# Patient Record
Sex: MALE | Race: OTHER | NOT HISPANIC OR LATINO | URBAN - METROPOLITAN AREA
[De-identification: names, ages, dates, MRNs, and addresses within clinical notes are randomized per-mention and may not be internally consistent; named-entity substitution may affect disease eponyms.]

---

## 2024-08-22 ENCOUNTER — INPATIENT (INPATIENT)
Facility: HOSPITAL | Age: 69
LOS: 7 days | Discharge: HOME | End: 2024-08-30
Attending: HOSPITALIST | Admitting: HOSPITALIST
Payer: MEDICAID

## 2024-08-22 VITALS
DIASTOLIC BLOOD PRESSURE: 72 MMHG | HEART RATE: 93 BPM | RESPIRATION RATE: 16 BRPM | WEIGHT: 235.01 LBS | SYSTOLIC BLOOD PRESSURE: 106 MMHG | OXYGEN SATURATION: 97 % | HEIGHT: 68 IN | TEMPERATURE: 98 F

## 2024-08-22 DIAGNOSIS — Z78.9 OTHER SPECIFIED HEALTH STATUS: ICD-10-CM

## 2024-08-22 DIAGNOSIS — K74.60 UNSPECIFIED CIRRHOSIS OF LIVER: ICD-10-CM

## 2024-08-22 DIAGNOSIS — Z98.890 OTHER SPECIFIED POSTPROCEDURAL STATES: Chronic | ICD-10-CM

## 2024-08-22 DIAGNOSIS — E11.9 TYPE 2 DIABETES MELLITUS WITHOUT COMPLICATIONS: ICD-10-CM

## 2024-08-22 DIAGNOSIS — K76.0 FATTY (CHANGE OF) LIVER, NOT ELSEWHERE CLASSIFIED: ICD-10-CM

## 2024-08-22 DIAGNOSIS — E78.5 HYPERLIPIDEMIA, UNSPECIFIED: ICD-10-CM

## 2024-08-22 DIAGNOSIS — Z29.9 ENCOUNTER FOR PROPHYLACTIC MEASURES, UNSPECIFIED: ICD-10-CM

## 2024-08-22 DIAGNOSIS — E87.29 OTHER ACIDOSIS: ICD-10-CM

## 2024-08-22 DIAGNOSIS — I10 ESSENTIAL (PRIMARY) HYPERTENSION: ICD-10-CM

## 2024-08-22 LAB
A1C WITH ESTIMATED AVERAGE GLUCOSE RESULT: 8.1 % — HIGH (ref 4–5.6)
ADD ON TEST-SPECIMEN IN LAB: SIGNIFICANT CHANGE UP
ALBUMIN SERPL ELPH-MCNC: 2.8 G/DL — LOW (ref 3.3–5)
ALP SERPL-CCNC: 180 U/L — HIGH (ref 40–120)
ALT FLD-CCNC: 29 U/L — SIGNIFICANT CHANGE UP (ref 4–41)
AMMONIA BLD-MCNC: 52 UMOL/L — SIGNIFICANT CHANGE UP (ref 11–55)
ANION GAP SERPL CALC-SCNC: 16 MMOL/L — HIGH (ref 7–14)
APAP SERPL-MCNC: <10 UG/ML — LOW (ref 15–25)
APTT BLD: 39.5 SEC — HIGH (ref 24.5–35.6)
AST SERPL-CCNC: 99 U/L — HIGH (ref 4–40)
B-OH-BUTYR SERPL-SCNC: 1 MMOL/L — HIGH (ref 0–0.4)
BASOPHILS # BLD AUTO: 0.06 K/UL — SIGNIFICANT CHANGE UP (ref 0–0.2)
BASOPHILS NFR BLD AUTO: 0.6 % — SIGNIFICANT CHANGE UP (ref 0–2)
BILIRUB DIRECT SERPL-MCNC: 2.6 MG/DL — HIGH (ref 0–0.3)
BILIRUB INDIRECT FLD-MCNC: 1.2 MG/DL — HIGH (ref 0–1)
BILIRUB SERPL-MCNC: 3.8 MG/DL — HIGH (ref 0.2–1.2)
BILIRUB SERPL-MCNC: 3.8 MG/DL — HIGH (ref 0.2–1.2)
BUN SERPL-MCNC: 9 MG/DL — SIGNIFICANT CHANGE UP (ref 7–23)
CALCIUM SERPL-MCNC: 8.7 MG/DL — SIGNIFICANT CHANGE UP (ref 8.4–10.5)
CHLORIDE SERPL-SCNC: 95 MMOL/L — LOW (ref 98–107)
CO2 SERPL-SCNC: 20 MMOL/L — LOW (ref 22–31)
CREAT SERPL-MCNC: 1.08 MG/DL — SIGNIFICANT CHANGE UP (ref 0.5–1.3)
EGFR: 75 ML/MIN/1.73M2 — SIGNIFICANT CHANGE UP
EOSINOPHIL # BLD AUTO: 0.07 K/UL — SIGNIFICANT CHANGE UP (ref 0–0.5)
EOSINOPHIL NFR BLD AUTO: 0.7 % — SIGNIFICANT CHANGE UP (ref 0–6)
ESTIMATED AVERAGE GLUCOSE: 186 — SIGNIFICANT CHANGE UP
ETHANOL SERPL-MCNC: <10 MG/DL — SIGNIFICANT CHANGE UP
ETHANOL SERPL-MCNC: <10 MG/DL — SIGNIFICANT CHANGE UP
GAS PNL BLDV: SIGNIFICANT CHANGE UP
GLUCOSE BLDC GLUCOMTR-MCNC: 110 MG/DL — HIGH (ref 70–99)
GLUCOSE BLDC GLUCOMTR-MCNC: 119 MG/DL — HIGH (ref 70–99)
GLUCOSE SERPL-MCNC: 150 MG/DL — HIGH (ref 70–99)
HCT VFR BLD CALC: 41.4 % — SIGNIFICANT CHANGE UP (ref 39–50)
HGB BLD-MCNC: 14.6 G/DL — SIGNIFICANT CHANGE UP (ref 13–17)
IANC: 6.55 K/UL — SIGNIFICANT CHANGE UP (ref 1.8–7.4)
IGA FLD-MCNC: 260 MG/DL — SIGNIFICANT CHANGE UP (ref 70–400)
IGG FLD-MCNC: 1342 MG/DL — SIGNIFICANT CHANGE UP (ref 700–1600)
IGM SERPL-MCNC: 99 MG/DL — SIGNIFICANT CHANGE UP (ref 40–230)
IMM GRANULOCYTES NFR BLD AUTO: 1.4 % — HIGH (ref 0–0.9)
INR BLD: 1.57 RATIO — HIGH (ref 0.85–1.18)
LYMPHOCYTES # BLD AUTO: 1.25 K/UL — SIGNIFICANT CHANGE UP (ref 1–3.3)
LYMPHOCYTES # BLD AUTO: 13.4 % — SIGNIFICANT CHANGE UP (ref 13–44)
MAGNESIUM SERPL-MCNC: 1.6 MG/DL — SIGNIFICANT CHANGE UP (ref 1.6–2.6)
MCHC RBC-ENTMCNC: 31.9 PG — SIGNIFICANT CHANGE UP (ref 27–34)
MCHC RBC-ENTMCNC: 35.3 GM/DL — SIGNIFICANT CHANGE UP (ref 32–36)
MCV RBC AUTO: 90.4 FL — SIGNIFICANT CHANGE UP (ref 80–100)
MONOCYTES # BLD AUTO: 1.28 K/UL — HIGH (ref 0–0.9)
MONOCYTES NFR BLD AUTO: 13.7 % — SIGNIFICANT CHANGE UP (ref 2–14)
NEUTROPHILS # BLD AUTO: 6.55 K/UL — SIGNIFICANT CHANGE UP (ref 1.8–7.4)
NEUTROPHILS NFR BLD AUTO: 70.2 % — SIGNIFICANT CHANGE UP (ref 43–77)
NRBC # BLD: 0 /100 WBCS — SIGNIFICANT CHANGE UP (ref 0–0)
NRBC # FLD: 0 K/UL — SIGNIFICANT CHANGE UP (ref 0–0)
PHOSPHATE SERPL-MCNC: 2.9 MG/DL — SIGNIFICANT CHANGE UP (ref 2.5–4.5)
PLATELET # BLD AUTO: 258 K/UL — SIGNIFICANT CHANGE UP (ref 150–400)
POTASSIUM SERPL-MCNC: 4.3 MMOL/L — SIGNIFICANT CHANGE UP (ref 3.5–5.3)
POTASSIUM SERPL-SCNC: 4.3 MMOL/L — SIGNIFICANT CHANGE UP (ref 3.5–5.3)
PROT SERPL-MCNC: 6.6 G/DL — SIGNIFICANT CHANGE UP (ref 6–8.3)
PROTHROM AB SERPL-ACNC: 17.5 SEC — HIGH (ref 9.5–13)
RBC # BLD: 4.58 M/UL — SIGNIFICANT CHANGE UP (ref 4.2–5.8)
RBC # FLD: 15.3 % — HIGH (ref 10.3–14.5)
SALICYLATES SERPL-MCNC: <0.3 MG/DL — LOW (ref 15–30)
SODIUM SERPL-SCNC: 131 MMOL/L — LOW (ref 135–145)
TOXICOLOGY SCREEN, DRUGS OF ABUSE, SERUM RESULT: SIGNIFICANT CHANGE UP
TROPONIN T, HIGH SENSITIVITY RESULT: 19 NG/L — SIGNIFICANT CHANGE UP
WBC # BLD: 9.34 K/UL — SIGNIFICANT CHANGE UP (ref 3.8–10.5)
WBC # FLD AUTO: 9.34 K/UL — SIGNIFICANT CHANGE UP (ref 3.8–10.5)

## 2024-08-22 PROCEDURE — 74177 CT ABD & PELVIS W/CONTRAST: CPT | Mod: 26

## 2024-08-22 PROCEDURE — 70450 CT HEAD/BRAIN W/O DYE: CPT | Mod: 26

## 2024-08-22 PROCEDURE — 99285 EMERGENCY DEPT VISIT HI MDM: CPT

## 2024-08-22 PROCEDURE — 71260 CT THORAX DX C+: CPT | Mod: 26

## 2024-08-22 PROCEDURE — 99223 1ST HOSP IP/OBS HIGH 75: CPT | Mod: GC

## 2024-08-22 PROCEDURE — 99233 SBSQ HOSP IP/OBS HIGH 50: CPT

## 2024-08-22 RX ORDER — DEXTROSE 15 G/33 G
15 GEL IN PACKET (GRAM) ORAL ONCE
Refills: 0 | Status: DISCONTINUED | OUTPATIENT
Start: 2024-08-22 | End: 2024-08-30

## 2024-08-22 RX ORDER — ONDANSETRON 2 MG/ML
8 INJECTION, SOLUTION INTRAMUSCULAR; INTRAVENOUS EVERY 8 HOURS
Refills: 0 | Status: DISCONTINUED | OUTPATIENT
Start: 2024-08-22 | End: 2024-08-23

## 2024-08-22 RX ORDER — LORAZEPAM 4 MG/ML
1 INJECTION INTRAMUSCULAR; INTRAVENOUS
Refills: 0 | Status: DISCONTINUED | OUTPATIENT
Start: 2024-08-22 | End: 2024-08-25

## 2024-08-22 RX ORDER — FOLIC ACID 1 MG
1 TABLET ORAL DAILY
Refills: 0 | Status: DISCONTINUED | OUTPATIENT
Start: 2024-08-22 | End: 2024-08-30

## 2024-08-22 RX ORDER — DEXTROSE 15 G/33 G
25 GEL IN PACKET (GRAM) ORAL ONCE
Refills: 0 | Status: DISCONTINUED | OUTPATIENT
Start: 2024-08-22 | End: 2024-08-30

## 2024-08-22 RX ORDER — SODIUM CHLORIDE 9 MG/ML
250 INJECTION INTRAMUSCULAR; INTRAVENOUS; SUBCUTANEOUS ONCE
Refills: 0 | Status: COMPLETED | OUTPATIENT
Start: 2024-08-22 | End: 2024-08-23

## 2024-08-22 RX ORDER — PANTOPRAZOLE SODIUM 40 MG
40 TABLET, DELAYED RELEASE (ENTERIC COATED) ORAL DAILY
Refills: 0 | Status: DISCONTINUED | OUTPATIENT
Start: 2024-08-22 | End: 2024-08-25

## 2024-08-22 RX ORDER — PANTOPRAZOLE SODIUM 40 MG
40 TABLET, DELAYED RELEASE (ENTERIC COATED) ORAL
Refills: 0 | Status: DISCONTINUED | OUTPATIENT
Start: 2024-08-22 | End: 2024-08-22

## 2024-08-22 RX ORDER — GLUCAGON INJECTION, SOLUTION 1 MG/.2ML
1 INJECTION, SOLUTION SUBCUTANEOUS ONCE
Refills: 0 | Status: DISCONTINUED | OUTPATIENT
Start: 2024-08-22 | End: 2024-08-30

## 2024-08-22 RX ORDER — THIAMINE HCL 250 MG
200 TABLET ORAL DAILY
Refills: 0 | Status: DISCONTINUED | OUTPATIENT
Start: 2024-08-22 | End: 2024-08-22

## 2024-08-22 RX ORDER — THIAMINE HCL 250 MG
500 TABLET ORAL DAILY
Refills: 0 | Status: COMPLETED | OUTPATIENT
Start: 2024-08-22 | End: 2024-08-27

## 2024-08-22 RX ORDER — HEPARIN SODIUM,BOVINE 1000/ML
5000 VIAL (ML) INJECTION EVERY 8 HOURS
Refills: 0 | Status: DISCONTINUED | OUTPATIENT
Start: 2024-08-22 | End: 2024-08-30

## 2024-08-22 RX ORDER — FOLIC ACID 1 MG
1 TABLET ORAL DAILY
Refills: 0 | Status: DISCONTINUED | OUTPATIENT
Start: 2024-08-22 | End: 2024-08-22

## 2024-08-22 RX ORDER — DEXTROSE 15 G/33 G
12.5 GEL IN PACKET (GRAM) ORAL ONCE
Refills: 0 | Status: DISCONTINUED | OUTPATIENT
Start: 2024-08-22 | End: 2024-08-30

## 2024-08-22 NOTE — H&P ADULT - PROBLEM SELECTOR PLAN 3
-Takes Metformin and Jardiance at home  Plan  -Hold PO medications inpatient  -Low Dose ISS -Patient presenting with Bicarb 20 and AG-16  -Likely starvation ketosis, but will also consider DKA  Plan  -Ordered VBG and Beta-Hydroxybutyrate

## 2024-08-22 NOTE — H&P ADULT - NSHPLABSRESULTS_GEN_ALL_CORE
.  LABS:                         14.6   9.34  )-----------( 258      ( 22 Aug 2024 14:27 )             41.4     08-22    131<L>  |  95<L>  |  9   ----------------------------<  150<H>  4.3   |  20<L>  |  1.08    Ca    8.7      22 Aug 2024 14:27    TPro  6.6  /  Alb  2.8<L>  /  TBili  3.8<H>  /  DBili  2.6<H>  /  AST  99<H>  /  ALT  29  /  AlkPhos  180<H>  08-22    PT/INR - ( 22 Aug 2024 14:27 )   PT: 17.5 sec;   INR: 1.57 ratio         PTT - ( 22 Aug 2024 14:27 )  PTT:39.5 sec  Urinalysis Basic - ( 22 Aug 2024 14:27 )    Color: x / Appearance: x / SG: x / pH: x  Gluc: 150 mg/dL / Ketone: x  / Bili: x / Urobili: x   Blood: x / Protein: x / Nitrite: x   Leuk Esterase: x / RBC: x / WBC x   Sq Epi: x / Non Sq Epi: x / Bacteria: x            RADIOLOGY, EKG & ADDITIONAL TESTS: Reviewed.

## 2024-08-22 NOTE — ED ADULT NURSE NOTE - OBJECTIVE STATEMENT
Patient presents to ED for generalized weakness, bodyaches, headaches, N/V x2 days. Son in law at bedside providing translation for [place]. He is A&Ox3, in no acute distress, calm, cooperative. Per family, patient has abnormal liver functions. History of hepatomegaly with severe hepatic steatosis, HTN, DM, HLD. 20G IV left AC, 20G IV right AC in place. Labs sent. On cardiac monitor showing SR in 70s bpm. Patient presents to ED for generalized weakness, bodyaches, headaches, N/V x2 days. Son in law at bedside providing translation for [place]. He is A&Ox3, in no acute distress, calm, cooperative. Per family, patient has abnormal liver functions. No CP, SOB. Respirations even, unlabored on room air. History of hepatomegaly with severe hepatic steatosis, HTN, DM, HLD, 20G IV right AC placed. Labs sent.

## 2024-08-22 NOTE — ED PROVIDER NOTE - ATTENDING CONTRIBUTION TO CARE
DR. DICKENS, ATTENDING MD-  I performed a face to face bedside interview with the patient regarding history of present illness, review of symptoms and past medical history. I completed an independent physical exam.  I have discussed the patient's plan of care with the resident.   Documentation as above in the note.    68 y/o male h/o dm htn hep steatosis sent in by hepatology for admission.  Hepatology fellow at bedside, requests labs ct and admission.  State they will f/u ct results.  Will obtain labs, order ct, admit.

## 2024-08-22 NOTE — ED PROVIDER NOTE - PHYSICAL EXAMINATION
Kalina Maldonado MD (PGY-1)  Physical Exam:    Gen: NAD, AOx3  Head: NCAT  HEENT: EOMI, PEERLA  Lung: CTAB, no respiratory distress, no wheezes/rhonchi/rales B/L  CV: +LE swelling. RRR, no murmurs, rubs or gallops  Abd: Umbilical hernia noted. Abdomen is protruberent/distended. Soft, NT,, no guarding, no rigidity, no rebound tenderness, no CVA tenderness   MSK: no visible deformities, ROM normal in UE/LE, no back pain  Neuro: No focal sensory or motor deficits. Sensation intact to light touch all extremities.  Psych: normal affect, calm

## 2024-08-22 NOTE — CONSULT NOTE ADULT - ATTENDING COMMENTS
This is a 69-year-old male with a history of hyperlipidemia, hypertension, and type 2 diabetes, presenting with worsening confusion, abdominal distension, and ascites, raising concern for acute decompensated cirrhosis, likely due to metabolic-associated liver disease (MetALD) in the context of chronic alcohol use. The patient reports a one-month history of abdominal pain, nausea, vomiting, and poor oral intake. Given his symptoms and history, there is concern for acute decompensation potentially due to worsening liver disease. The patient has a MELD score of 21 and a Child-Islas score of C 11. Although no hepatic encephalopathy (HE) is currently evident, and the patient is alert and oriented, significant ascites is suspected on physical exam. Need a cross sectional imaging with CT of the abdomen triple-phase. If ascites on CT noted a diagnostic paracentesis will be necessary to rule out spontaneous bacterial peritonitis (SBP). Agree with the infectious work up. Agree with empiric treatment with high-dose thiamine, folate, and multivitamins, and the patient will be admitted for further management. He remains AAO X 3. No need for lactulose/rifaximine as of now.

## 2024-08-22 NOTE — H&P ADULT - PROBLEM SELECTOR PLAN 5
-Will hold home statin due to LFT elevation -Patients SBP at normal limits- will hold Amlodipine. Family reports he has not been taking it  -Will restart if BP elevates

## 2024-08-22 NOTE — H&P ADULT - NSHPREVIEWOFSYSTEMS_GEN_ALL_CORE
REVIEW OF SYSTEMS:    CONSTITUTIONAL: Has weakness   EYES/ENT: No visual changes;  No vertigo or throat pain   NECK: No pain or stiffness  RESPIRATORY: No cough, wheezing, hemoptysis; No shortness of breath  CARDIOVASCULAR: No chest pain or palpitations  GASTROINTESTINAL: Has intermittent abdominal pain, nausea and vomiting,   GENITOURINARY: No dysuria, frequency or hematuria  NEUROLOGICAL: No numbness or weakness  SKIN: No itching, rashes No

## 2024-08-22 NOTE — H&P ADULT - HISTORY OF PRESENT ILLNESS
Patient is a 68 y/o male with pmh of HTN, DM, HLD and recently diagnosed hepatic steatosis presenting to the hospital due to lethargy. Patients daughter provided translation and collateral over the phone. She states that for around the past several weeks patient has been more fatigued at home and has had decreased PO intake. About a month ago he went to his PCP with these complaints and he was found to have hepatic steatosis. Since then, his family reports that he has appeared worse. He really has not been eating much and has been endorsing abdominal pain, nausea and vomiting intermittently. Due to worsening N/V the patient was brought to [facility] ED on 6/13  for further work up. RUQ U/S at that time was hepatic steatosis, no gallstones. AST//55 w/ TB 2.5, w/ initial infectious work up unremarkable. The patient was admitted for further work up however had AMAed after a few hours. The patient presented to Dr. Fuad Garcia today but was notably confused, lethargic and w/ significant abdominal distension prompting him to recommend pt to visit ED for further eval. Patient currently states that he feels a little better compared to prior.     Family reports that patient has a hx of heavy alcohol use, but he never admits to the family how much he actually drinks. They report that for the past 2 weeks at least he has been home and has not been seen consuming any alcohol.     Upon arrival to the ED, patient was afebrile and hemodynamically stable.

## 2024-08-22 NOTE — ED PROVIDER NOTE - CLINICAL SUMMARY MEDICAL DECISION MAKING FREE TEXT BOX
Patient is a 70 y/o M presenting to the ED with complaints of headache, and sent by hepatologist for admission for hepatic steatosis. The patient is a poor historian according to son in law, he does not like to discuss symptoms. He was recently seen by hepatologist and was found to have some abnormal labs. He has also not been eating the same amount for the last ten days, and has a headache. The patient has been nauseous and vomiting. Per son in law, on-and-off fevers for the last ten days.    CBC, CMP, INR, PT, PTT ordered. CT head (Non-Con), CT chest  and abdomen and pelvis (with IV contrast) ordered.  Patient likely being admitted to medicine with Liver team involved; liver fellow Dr. Dupont seen at bedside who informed me attending Dr. Broussard aware of patient being sent in by outpatient doctor (Esdras Broussard MD).

## 2024-08-22 NOTE — H&P ADULT - ASSESSMENT
Patient is a 70 y/o male with pmh of HTN, DM, HLD and recently diagnosed hepatic steatosis presenting to the hospital due to abnormal liver enzymes. 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 Pending further workup with Hepatology.

## 2024-08-22 NOTE — H&P ADULT - NSHPPHYSICALEXAM_GEN_ALL_CORE
LOS:     VITALS:   T(C): 37.1 (08-22-24 @ 15:42), Max: 37.1 (08-22-24 @ 15:42)  HR: 87 (08-22-24 @ 15:42) (87 - 93)  BP: 112/78 (08-22-24 @ 15:42) (106/72 - 112/78)  RR: 17 (08-22-24 @ 15:42) (16 - 17)  SpO2: 96% (08-22-24 @ 15:42) (96% - 97%)    GENERAL: NAD  HEAD:  Atraumatic, Normocephalic  EYES: EOMI, PERRLA, conjunctiva and sclera clear  ENT: Moist mucous membranes  NECK: Supple, No elevated JVP.  CHEST/LUNG: Clear to auscultation bilaterally; No rales, rhonchi, or wheezes.   HEART: Regular rate and rhythm; No murmurs, rubs, or gallops  ABDOMEN: Bowel sounds present; Soft, nontender, moderate abdominal distension   EXTREMITIES:  2+ Peripheral Pulses, brisk capillary refill. No clubbing, cyanosis, or edema  NERVOUS SYSTEM:  A&Ox3, no focal deficits, no asterixis   SKIN: No rashes or lesions

## 2024-08-22 NOTE — H&P ADULT - TIME BILLING
Reviewed lab data, radiology results, consultants' recommendations, documentation in [employer], discussed with family, ACP, interdisciplinary staff and/or intervention were performed.

## 2024-08-22 NOTE — ED ADULT TRIAGE NOTE - CHIEF COMPLAINT QUOTE
Pt stopped taking his medications for 2 weeks.  Pt c/o headache, bodyaches and generalized weakness.  Pt has been vomiting for 2 days.   Hx: hepatomegaly with severe hepatic steatosis, DM, HTN, high cholesterol

## 2024-08-22 NOTE — CONSULT NOTE ADULT - ASSESSMENT
TOVA DOBBS is a 69y Male who was consulted for ***. The patient has a PMH of ***, admitted to the hospital for ***.     #  #  Suspect likely worsening MetALD cirrhosis given alcoholism w/ acute decompensation w/ new significant ascites and encephalopathy.    MELD: *** (predicts a *** mortality)  Child-Islas: ***  HE: [place] Class 1, though unclear if encephalopathy related to poor PO intake vs 2/2 liver dysfunction   Ascites: likely large ascites on exam. Pending CT A/P, will likely need diagnostic and therapeutic paracentesis   -  Start CTX 2g QD for 5 days, and 1.5 g/kg albumin day 1, 1g/kg day 3   HCC: (last MRI date)  HRS:     #Abdominal Pain  #Nausea/Vomiting w/ Poor PO intake       Recommendations:  -trend clinical symptoms, exam findings, vital signs, CBC, CMP, INR    Recommendations:  -       All recommendations are preliminary until attending attestation present.    Discussed with ***.       Note incomplete until finalized by attending signature/attestation.    Brad Dupont  GI/Hepatology Fellow, PGY-4    MONDAY-FRIDAY 8AM-5PM:  Please message via [place] or email [email or web address]     On Weekends/Holidays (All day) and Weekdays after 5 PM to 8 AM  For nonurgent consults please email:  Please email [email or web address]  For urgent consults:  Please contact on call GI team. See Amion schedule ([facility]), [employer] paging system ([facility]), or call hospital  ([facility])      TOVA DOBBS is a 69y Male who was consulted for ***. The patient has a PMH of ***, admitted to the hospital for ***.     #Decompensated likely MetALD cirrhosis   #  Pt presenting w/ 1 month hx of abdominal distension, pain, N/V and poor PO intake, sent by outpatient hepatologist 2/2 concern for worsening decompensated cirrhosis. Prior imaging w/ Hepatic steatosis. Suspect likely worsening MetALD cirrhosis given alcoholism w/ acute decompensation w/ new significant ascites and encephalopathy. Given hx of fevers, decompensation could be in the setting of infection vs worsening liver disease. MELD on admission is 21, w/ some evidence of encephalopathy    MELD: 21  Child-Islas: C 11  HE: [place] Class 1, though unclear if encephalopathy related to poor PO intake vs 2/2 liver dysfunction   Ascites: likely large ascites on exam. Pending CT A/P, will likely need diagnostic and therapeutic paracentesis   -  Start CTX 2g QD for 5 days, and 1.5 g/kg albumin day 1, 1g/kg day 3   HCC: (last MRI date)  HRS:     #Abdominal Pain  #Nausea/Vomiting w/ Poor PO intake.   May be related to infection vs increased abdominal distension 2/2 ascites. Pending CT A/P for further evaluation    Recommendations:  - Follow up CT A/P.   - If ascites present on imaging, please obtain diagnostic paracentesis for w/u and to r/o SBP    -trend clinical symptoms, exam findings, vital signs, CBC, CMP, INR        All recommendations are preliminary until attending attestation present.    Discussed with ***.       Note incomplete until finalized by attending signature/attestation.    Brad Dupont  GI/Hepatology Fellow, PGY-4    MONDAY-FRIDAY 8AM-5PM:  Please message via [employer] or email [email or web address]     On Weekends/Holidays (All day) and Weekdays after 5 PM to 8 AM  For nonurgent consults please email:  Please email [email or web address]  For urgent consults:  Please contact on call GI team. See Amion schedule ([facility]), [employer] paging system ([facility]), or call hospital  ([facility])     TOVA DOBBS is a 69year old Male with history of HLD, HLD , DM2 who presents with worsening confusion and ascites w/ concern for acute decompensated cirrhosis.     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 Prior imaging w/ Hepatic steatosis. Suspect likely worsening MetALD cirrhosis given alcoholism w/ acute decompensation w/ new significant ascites and encephalopathy. Given hx of fevers, decompensation could be in the setting of infection vs worsening liver disease. MELD on admission is 21, w/ some evidence of encephalopathy    MELD: 21  Child-Islas: C 11  HE: [place] Class 1, though unclear if encephalopathy related to poor PO intake vs 2/2 liver dysfunction   Ascites: likely large ascites on exam. Pending CT A/P, will likely need diagnostic and therapeutic paracentesis   -  Start CTX 2g QD for 5 days, and 1.5 g/kg albumin day 1, 1g/kg day 3   HCC: (last MRI date)  HRS:     #Abdominal Pain  #Nausea/Vomiting w/ Poor PO intake.   May be related to infection vs increased abdominal distension 2/2 ascites. Pending CT A/P for further evaluation    Recommendations:  - Follow up CT A/P.   - If ascites present on imaging, please obtain diagnostic paracentesis for w/u and to r/o SBP    -trend clinical symptoms, exam findings, vital signs, CBC, CMP, INR        All recommendations are preliminary until attending attestation present.    Discussed with ***.       Note incomplete until finalized by attending signature/attestation.    Brad Dupont  GI/Hepatology Fellow, PGY-4    MONDAY-FRIDAY 8AM-5PM:  Please message via [employer] or email [email or web address]     On Weekends/Holidays (All day) and Weekdays after 5 PM to 8 AM  For nonurgent consults please email:  Please email [email or web address]  For urgent consults:  Please contact on call GI team. See Amion schedule ([facility]), [employer] paging system ([facility]), or call hospital  ([facility])     TOVA DOBBS is a 69year old Male with history of HLD, HLD , DM2 who presents with worsening confusion and ascites w/ concern for acute decompensated cirrhosis.     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 Prior imaging w/ Hepatic steatosis. Suspect likely worsening MetALD cirrhosis given alcoholism w/ acute decompensation w/ new significant ascites and encephalopathy. Given hx of fevers, decompensation could be in the setting of worsening liver disease vs infection.     MELD: 21  Child-Islas: C 11  HE: no evidence of HE. A&Ox3, completed serial 7s without difficulty.   Varices: No prior EGD. No hx of varices  Ascites: likely large ascites on exam. Pending CT A/P, will likely need diagnostic and therapeutic paracentesis   HCC: no prior MRI, pending CT     #Abdominal Pain  #Nausea/Vomiting w/ Poor PO intake.   May be related to infection vs increased abdominal distension 2/2 ascites. Pending CT A/P for further evaluation. Currently minimally symptomatic, may benefit w/ food challenge.     Recommendations:  - Follow up CT A/P to r/o acute infection and for liver characterization. Please make sure CT A/P is triple phase w liver protocol  - If ascites present on imaging, please obtain diagnostic paracentesis for w/u and to r/o SBP  - Please check EtoH level, Ammonia, PETH level [and preferably ethyl alcohol quantitative urine testing given faster turn around time] and serum/urine drug screening; check HAV IgM, HBsAg, HBsAb, HBcAb IgM, HCV Ab [with reflex HCV PCR if positive], HBV PCR, HEV IgM/IgG and assess for autoimmune etiology with SYEDA [antinuclear antibody], antimitochondrial antibody [AMA], ASMA [anti smooth muscle antibody], anti-LKM [liver kidney microsomal antibody], anti-sLA [soluble liver antigen antibody], and quantitative immunoglobulins [IgG, IgA, IgM], ceruloplasmin  - empiric high dose thiamine, folate, MVI  -trend clinical symptoms, exam findings, vital signs, CBC, CMP, INR  - Recommend admission to medicine for further management of likely decompensated cirrhosis.   - If develops encephalopathy (none apparent on our exam), consider administering lactulose 1x    All recommendations are preliminary until attending attestation present.    Discussed with Dr. Broussard       Note incomplete until finalized by attending signature/attestation.    Brad Dupont  GI/Hepatology Fellow, PGY-4    MONDAY-FRIDAY 8AM-5PM:  Please message via [employer] or email [email or web address]     On Weekends/Holidays (All day) and Weekdays after 5 PM to 8 AM  For nonurgent consults please email:  Please email [email or web address]  For urgent consults:  Please contact on call GI team. See Amion schedule ([facility]), [place] paging system ([facility]), or call hospital  ([facility])

## 2024-08-22 NOTE — H&P ADULT - PROBLEM SELECTOR PLAN 1
-Patients cirrhosis is likely secondary to Alchol use. Does not appear to be decompensated at this time  -MELD: 21, Child-Islas: C 11  Plan  -Will F/U CT A/P for any signs of ascites   -Will order Hepatitis serologies as per Hepatology. Autoimmune evaluation ordered   -EtOH, Ammonia and PETH levels ordered  -Urine and Serum drug screen ordered.   -Currently AOX3 and no asterixis, will hold off starting lactulose.

## 2024-08-22 NOTE — ED ADULT NURSE REASSESSMENT NOTE - NS ED NURSE REASSESS COMMENT FT1
Break RN: Patient awake and resting in stretcher; VSS, respirations even and unlabored, no signs/symptoms of acute distress. Patient denies dyspnea, shortness of breath, and chest pain. Patient denies pain and offers no complaints at this time. Labs collected and sent, FS rechecked, CIWA documented in flowsheet, report given to SENG Zeng. Safety measures in place.

## 2024-08-22 NOTE — CONSULT NOTE ADULT - SUBJECTIVE AND OBJECTIVE BOX
Chief Complaint:  Patient is a 69y old  Male who presents with a chief complaint of     HPI:  TOVA DOBBS is a 69year old Male with history of HLD, HLD , DM2 who presents with worsening confusion and ascites.   History was obtained from patient's son in law as patient was confused. The patient was diagnosed with hepatic steatosis by his PCP approx 1 month ago, following that the patient had gotten depressed. He started eating less, stopped taking his medications. He was also experiencing intermittent fevers as well as epigastric pain w/ associated nausea and vomiting. Unclear triggers for nausea/vomiting as patient could not explain it but he had episodes 2-3 timea week w/ emesis appearing to be yellow liquid, no blood. Due to worsening N/V the patient was brought to [facility] ED on 6/13  for further work up. RUQ U/S at that time was hepatic steatosis, no gallstones. AST//55 w/ TB 2.5, w/ initial infectious work up unremarkable. The patient was admitted for further work up however had AMAed after a few hours. The patient presented to Dr. Fuad Garcia today but was notably confused, lethargic and w/ significant abdominal distension prompting him to recommend pt to visit ED for further eval.     The patient is known to be a chronic alcoholic however family is not sure how much he drinks or when his last drink was because he's been trying to hide it from them. His son in law reports that he has frequently found him lying on the floor in the morning due to prior drinking. Does not reportedly smoke or use recreational drugs. No prior hx of liver disease noted until last month.     In the ED the patient just reports he is feeling sick and unwell but cannot describe in detail. He is A&Ox3 but has difficulty verbalizing his symptoms.         ROS:   General:  No fevers, chills, night sweats  Eyes:  Good vision, no reported pain  ENT:  No sore throat, pain, runny nose  CV:  No pain, palpitations  Pulm:  No dyspnea, cough  GI:  See HPI, otherwise negative  :  No incontinence, nocturia  Muscle:  No reported pain, weakness  Neuro:  No memory problems  Psych:  No insomnia, psychosis  Endocrine:  No polyuria, polydipsia  Heme:  No petechiae, ecchymosis, easy bruisability  Skin:  No reported rash    PMHX/PSHX:      Allergies:  No Known Allergies      Home Medications: reviewed  Hospital Medications:      Social History:   Tobacco: Denies  EtOH: Denies  Illicit Drugs: Denies    Family history:      Denies family history of colon cancer/polyps, stomach cancer/polyps, pancreatic cancer/masses, liver cancer/disease, ovarian cancer and endometrial cancer.    PHYSICAL EXAM:   Vital Signs:  Vital Signs Last 24 Hrs  T(C): 36.9 (22 Aug 2024 12:27), Max: 36.9 (22 Aug 2024 12:27)  T(F): 98.5 (22 Aug 2024 12:27), Max: 98.5 (22 Aug 2024 12:27)  HR: 93 (22 Aug 2024 12:27) (93 - 93)  BP: 106/72 (22 Aug 2024 12:27) (106/72 - 106/72)  BP(mean): --  RR: 16 (22 Aug 2024 12:27) (16 - 16)  SpO2: 97% (22 Aug 2024 12:27) (97% - 97%)    Parameters below as of 22 Aug 2024 12:27  Patient On (Oxygen Delivery Method): room air      Daily Height in cm: 172.72 (22 Aug 2024 12:27)    Daily     GENERAL: tired appearing  NEURO: alert; responds to commands, but difficulty w/ serial 7s   HEENT: anicteric sclera, no conjunctival pallor appreciated  CHEST: no respiratory distress, no accessory muscle use  CARDIAC: regular rate, +S1/S2  ABDOMEN: distended abdomen, no significan TTP, fluid wave +   EXTREMITIES: trace LE edema   SKIN: no lesions noted    LABS: reviewed            Diagnostic Studies: see sunrise for full report         Chief Complaint:  Patient is a 69y old  Male who presents with a chief complaint of     HPI:  TOVA DOBBS is a 69year old Male with history of HLD, HLD , DM2 who presents with worsening confusion and ascites.   History was obtained from patient's son in law as patient was confused. The patient was diagnosed with hepatic steatosis by his PCP approx 1 month ago, following that the patient had gotten depressed. He started eating less, stopped taking his medications. He was also experiencing intermittent fevers as well as epigastric pain w/ associated nausea and vomiting. Unclear triggers for nausea/vomiting as patient could not explain it but he had episodes 2-3 timea week w/ emesis appearing to be yellow liquid, no blood. Due to worsening N/V the patient was brought to [facility] ED on 6/13  for further work up. RUQ U/S at that time was hepatic steatosis, no gallstones. AST//55 w/ TB 2.5, w/ initial infectious work up unremarkable. The patient was admitted for further work up however had AMAed after a few hours. The patient presented to Dr. Fuad Garcia today but was notably confused, lethargic and w/ significant abdominal distension prompting him to recommend pt to visit ED for further eval.     The patient is known to be a chronic alcoholic however family is not sure how much he drinks or when his last drink was. They know it's been > 22 days since last drink because he's just been at home. His son in law reports that he has frequently found him lying on the floor in the morning due to prior drinking. Does not reportedly smoke or use recreational drugs. No prior hx of liver disease noted until last month.     In the ED the patient just reports he is feeling sick and unwell but cannot describe in detail, though on assessment w/ my attending patient was able to perform serial 7s and express that he had been experiencing a headache and felt unwell.         ROS:   General:  No fevers, chills, night sweats  Eyes:  Good vision, no reported pain  ENT:  No sore throat, pain, runny nose  CV:  No pain, palpitations  Pulm:  No dyspnea, cough  GI:  See HPI, otherwise negative  :  No incontinence, nocturia  Muscle:  No reported pain, weakness  Neuro:  No memory problems  Psych:  No insomnia, psychosis  Endocrine:  No polyuria, polydipsia  Heme:  No petechiae, ecchymosis, easy bruisability  Skin:  No reported rash    PMHX/PSHX:      Allergies:  No Known Allergies      Home Medications: reviewed  Hospital Medications:      Social History:   Tobacco: Denies  EtOH: Denies  Illicit Drugs: Denies    Family history:      Denies family history of colon cancer/polyps, stomach cancer/polyps, pancreatic cancer/masses, liver cancer/disease, ovarian cancer and endometrial cancer.    PHYSICAL EXAM:   Vital Signs:  Vital Signs Last 24 Hrs  T(C): 36.9 (22 Aug 2024 12:27), Max: 36.9 (22 Aug 2024 12:27)  T(F): 98.5 (22 Aug 2024 12:27), Max: 98.5 (22 Aug 2024 12:27)  HR: 93 (22 Aug 2024 12:27) (93 - 93)  BP: 106/72 (22 Aug 2024 12:27) (106/72 - 106/72)  BP(mean): --  RR: 16 (22 Aug 2024 12:27) (16 - 16)  SpO2: 97% (22 Aug 2024 12:27) (97% - 97%)    Parameters below as of 22 Aug 2024 12:27  Patient On (Oxygen Delivery Method): room air      Daily Height in cm: 172.72 (22 Aug 2024 12:27)    Daily     GENERAL: tired appearing  NEURO: alert; responds to commands, completed serial 7s successfully   HEENT: anicteric sclera, no conjunctival pallor appreciated  CHEST: no respiratory distress, no accessory muscle use  CARDIAC: regular rate, +S1/S2  ABDOMEN: distended abdomen, no significant TTP, fluid wave +   EXTREMITIES: trace LE edema   SKIN: no lesions noted    LABS: reviewed            Diagnostic Studies: see sunrise for full report

## 2024-08-22 NOTE — H&P ADULT - ATTENDING COMMENTS
70 yo M w/ hx HTN, DM, hepatic steatosis ( recent admission for similar compliants at [facility] found to have elevated LFTs US reported as fatty liver left AMA) , alcohol use ( endorsed differing alcohol use; reported 2 beers/day but as per family has been found on the floor by family intoxicated) p/w lethargy and weakness. +N/V over past month non bloody with decreased PO intake. Today presented to PCP with lethargy and confusion was sent to ED. labs significant for AG elevated LFTs exam with scleral icterus+ distended abdomen. Ct head w/o ICH     # cirrhosis- check CT A/P; NH4; monitor LFTs; lactulose if ammonia elevated; f/u cirrhosis w/u as per hepatology  # alcohol use- alcohol level; as per family has not drank in 22 days; MVI/folate; CIWA protocol; start PPI poss gastritis; f/u EKG   # elevated AG- check BOh buytrate; lactate and VBG  # hyponatremia- check Urine lytes/Cr  # DM- check A1c; ISS  # HTN- monitor off BP meds  # HLD - hold statin

## 2024-08-22 NOTE — ED PROVIDER NOTE - OBJECTIVE STATEMENT
Patient is a 69-year-old male  with with a past medical history of diabetes, hypertension, GERD, hepatic steatosis, presenting today complaining of abnormal labs (liver enzymes).  Patient is sent in by his PCP/hepatologist, who has already spoken to Dr. Esdras Broussard (liver) for planned admission.  Patient has known hepatic steatosis.  Patient has history of drinking.  Per son-in-law at bedside, patient does not understand/is confused or different than his "baseline".  Per son-in-law, patient has been having on and off fevers.  Patient has been experiencing vomiting, has not been eating, has not taken home meds in the last 10 days.

## 2024-08-22 NOTE — H&P ADULT - PROBLEM SELECTOR PLAN 6
Diet: Halal/CC  Dispo: Pending PT Consult   DVT ppx: Heparin 5k TID -Will hold home statin due to LFT elevation

## 2024-08-22 NOTE — H&P ADULT - PROBLEM SELECTOR PLAN 2
-Patient admits to alcohol use and family also report that he has consumed alcohol heavily in the past  -Family state he has not been seen drinking in 2 weeks   Plan  -Will start Thiamine and Folate   -Will order Low Risk CIWA   -Order EtOH and PETH levels- if elevated will start standing taper

## 2024-08-22 NOTE — ED PROVIDER NOTE - NS ED ROS FT
GENERAL: No fever or chills  EYES: No change in vision  HEENT: No trouble swallowing or speaking  CARDIAC: No chest pain  PULMONARY: No cough or SOB  GI: +nausea, + vomiting. No abdominal pain, no diarrhea or constipation  : No changes in urination  SKIN: No rashes  NEURO: No headache, no numbness  Otherwise as HPI or negative.      Difficult to obtain HPI, per son-in-law patient does not discuss symptoms easily.

## 2024-08-22 NOTE — H&P ADULT - PROBLEM SELECTOR PLAN 4
-Patients SBP at normal limits- will hold Amlodipine. Family reports he has not been taking it  -Will restart if BP elevates -Takes Metformin and Jardiance at home  Plan  -Hold PO medications inpatient  -Low Dose ISS

## 2024-08-23 DIAGNOSIS — N39.0 URINARY TRACT INFECTION, SITE NOT SPECIFIED: ICD-10-CM

## 2024-08-23 LAB
A1C WITH ESTIMATED AVERAGE GLUCOSE RESULT: 7.7 % — HIGH (ref 4–5.6)
ALBUMIN SERPL ELPH-MCNC: 2.7 G/DL — LOW (ref 3.3–5)
ALP SERPL-CCNC: 173 U/L — HIGH (ref 40–120)
ALT FLD-CCNC: 27 U/L — SIGNIFICANT CHANGE UP (ref 4–41)
AMMONIA BLD-MCNC: 50 UMOL/L — SIGNIFICANT CHANGE UP (ref 11–55)
ANION GAP SERPL CALC-SCNC: 14 MMOL/L — SIGNIFICANT CHANGE UP (ref 7–14)
ANION GAP SERPL CALC-SCNC: 14 MMOL/L — SIGNIFICANT CHANGE UP (ref 7–14)
APPEARANCE UR: CLEAR — SIGNIFICANT CHANGE UP
AST SERPL-CCNC: 90 U/L — HIGH (ref 4–40)
BACTERIA # UR AUTO: ABNORMAL /HPF
BASOPHILS # BLD AUTO: 0.08 K/UL — SIGNIFICANT CHANGE UP (ref 0–0.2)
BASOPHILS NFR BLD AUTO: 0.9 % — SIGNIFICANT CHANGE UP (ref 0–2)
BILIRUB SERPL-MCNC: 3.7 MG/DL — HIGH (ref 0.2–1.2)
BILIRUB UR-MCNC: ABNORMAL
BLD GP AB SCN SERPL QL: NEGATIVE — SIGNIFICANT CHANGE UP
BUN SERPL-MCNC: 10 MG/DL — SIGNIFICANT CHANGE UP (ref 7–23)
BUN SERPL-MCNC: 9 MG/DL — SIGNIFICANT CHANGE UP (ref 7–23)
CALCIUM SERPL-MCNC: 8.6 MG/DL — SIGNIFICANT CHANGE UP (ref 8.4–10.5)
CALCIUM SERPL-MCNC: 8.6 MG/DL — SIGNIFICANT CHANGE UP (ref 8.4–10.5)
CAST: 0 /LPF — SIGNIFICANT CHANGE UP (ref 0–4)
CERULOPLASMIN SERPL-MCNC: 31 MG/DL — HIGH (ref 15–30)
CHLORIDE SERPL-SCNC: 97 MMOL/L — LOW (ref 98–107)
CHLORIDE SERPL-SCNC: 99 MMOL/L — SIGNIFICANT CHANGE UP (ref 98–107)
CO2 SERPL-SCNC: 20 MMOL/L — LOW (ref 22–31)
CO2 SERPL-SCNC: 21 MMOL/L — LOW (ref 22–31)
COLOR SPEC: SIGNIFICANT CHANGE UP
CREAT ?TM UR-MCNC: 161 MG/DL — SIGNIFICANT CHANGE UP
CREAT SERPL-MCNC: 0.94 MG/DL — SIGNIFICANT CHANGE UP (ref 0.5–1.3)
CREAT SERPL-MCNC: 0.94 MG/DL — SIGNIFICANT CHANGE UP (ref 0.5–1.3)
DIFF PNL FLD: NEGATIVE — SIGNIFICANT CHANGE UP
EGFR: 89 ML/MIN/1.73M2 — SIGNIFICANT CHANGE UP
EGFR: 89 ML/MIN/1.73M2 — SIGNIFICANT CHANGE UP
EOSINOPHIL # BLD AUTO: 0.17 K/UL — SIGNIFICANT CHANGE UP (ref 0–0.5)
EOSINOPHIL NFR BLD AUTO: 1.9 % — SIGNIFICANT CHANGE UP (ref 0–6)
ESTIMATED AVERAGE GLUCOSE: 174 — SIGNIFICANT CHANGE UP
GLUCOSE BLDC GLUCOMTR-MCNC: 90 MG/DL — SIGNIFICANT CHANGE UP (ref 70–99)
GLUCOSE SERPL-MCNC: 100 MG/DL — HIGH (ref 70–99)
GLUCOSE SERPL-MCNC: 96 MG/DL — SIGNIFICANT CHANGE UP (ref 70–99)
GLUCOSE UR QL: NEGATIVE MG/DL — SIGNIFICANT CHANGE UP
HAV IGM SER-ACNC: SIGNIFICANT CHANGE UP
HBV CORE AB SER-ACNC: SIGNIFICANT CHANGE UP
HBV CORE IGM SER-ACNC: SIGNIFICANT CHANGE UP
HBV DNA # SERPL NAA+PROBE: SIGNIFICANT CHANGE UP
HBV DNA SERPL NAA+PROBE-LOG#: SIGNIFICANT CHANGE UP LOGIU/ML
HBV SURFACE AB SER-ACNC: SIGNIFICANT CHANGE UP
HBV SURFACE AG SER-ACNC: SIGNIFICANT CHANGE UP
HCT VFR BLD CALC: 43.6 % — SIGNIFICANT CHANGE UP (ref 39–50)
HCV AB S/CO SERPL IA: 0.19 S/CO — SIGNIFICANT CHANGE UP (ref 0–0.99)
HCV AB SERPL-IMP: SIGNIFICANT CHANGE UP
HGB BLD-MCNC: 14.7 G/DL — SIGNIFICANT CHANGE UP (ref 13–17)
IANC: 5.7 K/UL — SIGNIFICANT CHANGE UP (ref 1.8–7.4)
IMM GRANULOCYTES NFR BLD AUTO: 1 % — HIGH (ref 0–0.9)
INR BLD: 1.48 RATIO — HIGH (ref 0.85–1.18)
KETONES UR-MCNC: ABNORMAL MG/DL
LACTATE SERPL-SCNC: 1.9 MMOL/L — SIGNIFICANT CHANGE UP (ref 0.5–2)
LEUKOCYTE ESTERASE UR-ACNC: ABNORMAL
LYMPHOCYTES # BLD AUTO: 1.8 K/UL — SIGNIFICANT CHANGE UP (ref 1–3.3)
LYMPHOCYTES # BLD AUTO: 20.1 % — SIGNIFICANT CHANGE UP (ref 13–44)
MAGNESIUM SERPL-MCNC: 1.7 MG/DL — SIGNIFICANT CHANGE UP (ref 1.6–2.6)
MAGNESIUM SERPL-MCNC: 1.7 MG/DL — SIGNIFICANT CHANGE UP (ref 1.6–2.6)
MCHC RBC-ENTMCNC: 30.7 PG — SIGNIFICANT CHANGE UP (ref 27–34)
MCHC RBC-ENTMCNC: 33.7 GM/DL — SIGNIFICANT CHANGE UP (ref 32–36)
MCV RBC AUTO: 91 FL — SIGNIFICANT CHANGE UP (ref 80–100)
MONOCYTES # BLD AUTO: 1.13 K/UL — HIGH (ref 0–0.9)
MONOCYTES NFR BLD AUTO: 12.6 % — SIGNIFICANT CHANGE UP (ref 2–14)
MRSA PCR RESULT.: SIGNIFICANT CHANGE UP
NEUTROPHILS # BLD AUTO: 5.7 K/UL — SIGNIFICANT CHANGE UP (ref 1.8–7.4)
NEUTROPHILS NFR BLD AUTO: 63.5 % — SIGNIFICANT CHANGE UP (ref 43–77)
NITRITE UR-MCNC: POSITIVE
NRBC # BLD: 0 /100 WBCS — SIGNIFICANT CHANGE UP (ref 0–0)
NRBC # FLD: 0 K/UL — SIGNIFICANT CHANGE UP (ref 0–0)
OSMOLALITY UR: 496 MOSM/KG — SIGNIFICANT CHANGE UP (ref 50–1200)
PH UR: 6 — SIGNIFICANT CHANGE UP (ref 5–8)
PHOSPHATE SERPL-MCNC: 2.7 MG/DL — SIGNIFICANT CHANGE UP (ref 2.5–4.5)
PHOSPHATE SERPL-MCNC: 2.8 MG/DL — SIGNIFICANT CHANGE UP (ref 2.5–4.5)
PLATELET # BLD AUTO: 239 K/UL — SIGNIFICANT CHANGE UP (ref 150–400)
POTASSIUM SERPL-MCNC: 4 MMOL/L — SIGNIFICANT CHANGE UP (ref 3.5–5.3)
POTASSIUM SERPL-MCNC: 4.1 MMOL/L — SIGNIFICANT CHANGE UP (ref 3.5–5.3)
POTASSIUM SERPL-SCNC: 4 MMOL/L — SIGNIFICANT CHANGE UP (ref 3.5–5.3)
POTASSIUM SERPL-SCNC: 4.1 MMOL/L — SIGNIFICANT CHANGE UP (ref 3.5–5.3)
POTASSIUM UR-SCNC: 26.3 MMOL/L — SIGNIFICANT CHANGE UP
PROT ?TM UR-MCNC: 37 MG/DL — SIGNIFICANT CHANGE UP
PROT SERPL-MCNC: 6.3 G/DL — SIGNIFICANT CHANGE UP (ref 6–8.3)
PROT UR-MCNC: 30 MG/DL
PROT/CREAT UR-RTO: 0.2 RATIO — SIGNIFICANT CHANGE UP (ref 0–0.2)
PROTHROM AB SERPL-ACNC: 16.4 SEC — HIGH (ref 9.5–13)
RBC # BLD: 4.79 M/UL — SIGNIFICANT CHANGE UP (ref 4.2–5.8)
RBC # FLD: 15.8 % — HIGH (ref 10.3–14.5)
RBC CASTS # UR COMP ASSIST: 4 /HPF — SIGNIFICANT CHANGE UP (ref 0–4)
REVIEW: SIGNIFICANT CHANGE UP
RH IG SCN BLD-IMP: POSITIVE — SIGNIFICANT CHANGE UP
S AUREUS DNA NOSE QL NAA+PROBE: DETECTED
SODIUM SERPL-SCNC: 131 MMOL/L — LOW (ref 135–145)
SODIUM SERPL-SCNC: 134 MMOL/L — LOW (ref 135–145)
SODIUM UR-SCNC: < 20 MMOL/L — SIGNIFICANT CHANGE UP
SP GR SPEC: 1.08 — HIGH (ref 1–1.03)
SQUAMOUS # UR AUTO: 0 /HPF — SIGNIFICANT CHANGE UP (ref 0–5)
UROBILINOGEN FLD QL: 1 MG/DL — SIGNIFICANT CHANGE UP (ref 0.2–1)
UUN UR-MCNC: 380.2 MG/DL — SIGNIFICANT CHANGE UP
WBC # BLD: 8.97 K/UL — SIGNIFICANT CHANGE UP (ref 3.8–10.5)
WBC # FLD AUTO: 8.97 K/UL — SIGNIFICANT CHANGE UP (ref 3.8–10.5)
WBC UR QL: 78 /HPF — HIGH (ref 0–5)

## 2024-08-23 PROCEDURE — 99233 SBSQ HOSP IP/OBS HIGH 50: CPT | Mod: GC

## 2024-08-23 PROCEDURE — 99232 SBSQ HOSP IP/OBS MODERATE 35: CPT

## 2024-08-23 RX ORDER — POLYETHYLENE GLYCOL 3350 17 G/17G
17 POWDER, FOR SOLUTION ORAL DAILY
Refills: 0 | Status: DISCONTINUED | OUTPATIENT
Start: 2024-08-23 | End: 2024-08-30

## 2024-08-23 RX ORDER — MUPIROCIN 2 %
1 OINTMENT (GRAM) TOPICAL
Refills: 0 | Status: COMPLETED | OUTPATIENT
Start: 2024-08-23 | End: 2024-08-28

## 2024-08-23 RX ORDER — TAMSULOSIN HYDROCHLORIDE 0.4 MG/1
0.4 CAPSULE ORAL AT BEDTIME
Refills: 0 | Status: DISCONTINUED | OUTPATIENT
Start: 2024-08-23 | End: 2024-08-30

## 2024-08-23 RX ORDER — ALBUMIN (HUMAN) 5 G/20ML
100 SOLUTION INTRAVENOUS EVERY 6 HOURS
Refills: 0 | Status: COMPLETED | OUTPATIENT
Start: 2024-08-23 | End: 2024-08-24

## 2024-08-23 RX ORDER — CHLORHEXIDINE GLUCONATE 40 MG/ML
1 SOLUTION TOPICAL DAILY
Refills: 0 | Status: DISCONTINUED | OUTPATIENT
Start: 2024-08-23 | End: 2024-08-30

## 2024-08-23 RX ORDER — ONDANSETRON 2 MG/ML
4 INJECTION, SOLUTION INTRAMUSCULAR; INTRAVENOUS THREE TIMES A DAY
Refills: 0 | Status: COMPLETED | OUTPATIENT
Start: 2024-08-23 | End: 2024-08-25

## 2024-08-23 RX ADMIN — Medication 5000 UNIT(S): at 21:52

## 2024-08-23 RX ADMIN — CHLORHEXIDINE GLUCONATE 1 APPLICATION(S): 40 SOLUTION TOPICAL at 11:54

## 2024-08-23 RX ADMIN — ONDANSETRON 4 MILLIGRAM(S): 2 INJECTION, SOLUTION INTRAMUSCULAR; INTRAVENOUS at 21:51

## 2024-08-23 RX ADMIN — SODIUM CHLORIDE 250 MILLILITER(S): 9 INJECTION INTRAMUSCULAR; INTRAVENOUS; SUBCUTANEOUS at 00:06

## 2024-08-23 RX ADMIN — Medication 1 MILLIGRAM(S): at 11:52

## 2024-08-23 RX ADMIN — Medication 5000 UNIT(S): at 13:23

## 2024-08-23 RX ADMIN — Medication 5000 UNIT(S): at 05:04

## 2024-08-23 RX ADMIN — Medication 1 TABLET(S): at 11:53

## 2024-08-23 RX ADMIN — ALBUMIN (HUMAN) 50 MILLILITER(S): 5 SOLUTION INTRAVENOUS at 18:07

## 2024-08-23 RX ADMIN — Medication 100 MILLILITER(S): at 13:23

## 2024-08-23 RX ADMIN — Medication 100 MILLIGRAM(S): at 10:40

## 2024-08-23 RX ADMIN — Medication 1 APPLICATION(S): at 18:24

## 2024-08-23 RX ADMIN — Medication 105 MILLIGRAM(S): at 12:01

## 2024-08-23 RX ADMIN — Medication 40 MILLIGRAM(S): at 11:53

## 2024-08-23 NOTE — DISCHARGE NOTE PROVIDER - NSDCCAREPROVSEEN_GEN_ALL_CORE_FT
Iris, Loida Reynolds, Jared Mix, Lexii HOPPER [facility] medicine team 6  Dr. Darrell Bazan

## 2024-08-23 NOTE — DISCHARGE NOTE PROVIDER - NSFOLLOWUPCLINICS_GEN_ALL_ED_FT
[facility]  Dental  [address] [place] [address]  Phone: [phone]  Fax:

## 2024-08-23 NOTE — DISCHARGE NOTE PROVIDER - PROVIDER TOKENS
PROVIDER:[TOKEN:[[id number]],ESTABLISHEDPATIENT:[T]] PROVIDER:[TOKEN:[[id number]],FOLLOWUP:[1 week]] PROVIDER:[TOKEN:[[id number]],FOLLOWUP:[1 week]],PROVIDER:[TOKEN:[[id number]],FOLLOWUP:[1 week],ESTABLISHEDPATIENT:[T]]

## 2024-08-23 NOTE — DISCHARGE NOTE PROVIDER - NSDCMRMEDTOKEN_GEN_ALL_CORE_FT
amLODIPine 5 mg oral tablet: 1 tab(s) orally once a day  Jardiance 10 mg oral tablet: 1 tab(s) orally once a day  metFORMIN 500 mg oral tablet: 1 tab(s) orally once a day  multivitamin: 1 tab(s) orally once a day  omeprazole 40 mg oral delayed release capsule: 1 cap(s) orally once a day  rosuvastatin 40 mg oral tablet: 1 tab(s) orally once a day   amLODIPine 5 mg oral tablet: 1 tab(s) orally once a day  Jardiance 10 mg oral tablet: 1 tab(s) orally once a day  metFORMIN 500 mg oral tablet: 1 tab(s) orally once a day  metoclopramide 5 mg oral tablet: 1 tab(s) orally 3 times a day (before meals)  Multiple Vitamins oral tablet: 1 tab(s) orally once a day  omeprazole 40 mg oral delayed release capsule: 1 cap(s) orally once a day  rosuvastatin 40 mg oral tablet: 1 tab(s) orally once a day  tamsulosin 0.4 mg oral capsule: 1 cap(s) orally once a day (at bedtime)

## 2024-08-23 NOTE — PROGRESS NOTE ADULT - ATTENDING COMMENTS
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 The patient’s presentation is notable for abdominal distension and poor oral intake, but imaging does not show ascites or hepatic encephalopathy. Given the history of alcohol use and the clinical picture, mild alcoholic hepatitis is suspected, with a MELD score of 21 and Child-Islas Class C.    The patient's abdominal pain and nausea are likely multifactorial, possibly related to gastroparesis ( in the setting of DM), peptic ulcer disease, or an infectious process. A PO challenge is recommended, with Zofran for nausea management. If he cannot tolerate oral intake, an endoscopic evaluation may be necessary.     I agree with the planned lab workup to evaluate for potential contributing factors, including alcohol use, viral hepatitis, and autoimmune causes. Starting high-dose thiamine, folate, and multivitamins is essential. Given the stool burden on imaging, a trial of Miralax is appropriate. The patient will require close monitoring of his clinical status, including daily labs and vital signs, to guide further management.

## 2024-08-23 NOTE — PHYSICAL THERAPY INITIAL EVALUATION ADULT - PERTINENT HX OF CURRENT PROBLEM, REHAB EVAL
Patient is a 69 year old male, PMH stated below, presents with abnormal liver enzymes. 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

## 2024-08-23 NOTE — PROGRESS NOTE ADULT - PROBLEM SELECTOR PLAN 1
-Patients cirrhosis is likely secondary to Alchol use. Does not appear to be decompensated at this time  -MELD: 21, Child-Islas: C 11  Plan  -Will F/U CT A/P for any signs of ascites   -Will order Hepatitis serologies as per Hepatology. Autoimmune evaluation ordered   -EtOH, Ammonia and PETH levels ordered  -Urine and Serum drug screen ordered.   -Currently AOX3 and no asterixis, will hold off starting lactulose. -Patients cirrhosis is likely secondary to Alchol use. Does not appear to be decompensated at this time  -MELD: 21, Child-Islas: C 11  -CTAP on 8/22 showed Hepatic steatosis; Innumerable hypoattenuating subcentimeter nodules throughout the liver parenchyma, suggestive of regenerative nodules.   - serum drug screen negative, EtOH negative    Plan  -Will order Hepatitis serologies as per Hepatology. Autoimmune evaluation ordered   -f/u Ammonia and PETH levels   -Pending urine drug screen  -Currently AOX3 and no asterixis, will hold off starting lactulose

## 2024-08-23 NOTE — PROGRESS NOTE ADULT - PROBLEM SELECTOR PLAN 5
-Patients SBP at normal limits- will hold Amlodipine. Family reports he has not been taking it  -Will restart if BP elevates -Will hold home statin due to LFT elevation

## 2024-08-23 NOTE — DISCHARGE NOTE PROVIDER - NSDCCPTREATMENT_GEN_ALL_CORE_FT
PRINCIPAL PROCEDURE  Procedure: CT abdomen pelvis  Findings and Treatment: ACC: [id number] EXAM:  CT ABDOMEN AND PELVIS IC   ORDERED BY: YASHIRA KUMAR   ACC: [id number] EXAM:  CT CHEST IC   ORDERED BY: YASHIRA KUMAR   PROCEDURE DATE:  08/22/2024    INTERPRETATION:  CLINICAL INFORMATION: Altered mental status.  COMPARISON: None.  CONTRAST/COMPLICATIONS:  IV Contrast: Omnipaque 350  90 cc administered   10 cc discarded  Oral Contrast: NONE  Complications: None reported at time of study completion  PROCEDURE:  CT of the Chest, Abdomen and Pelvis was performed.  Sagittal and coronal reformats were performed.  FINDINGS:  CHEST:  LUNGS AND LARGE AIRWAYS: Patent central airways. No pulmonary nodule.  PLEURA: No pleural effusion.  VESSELS: Coronary artery calcifications.  HEART: Heart size is normal. No pericardial effusion.  MEDIASTINUM AND SLIME: No lymphadenopathy.  CHEST WALL AND LOWER NECK: Within normal limits.  ABDOMEN AND PELVIS:  LIVER: Steatosis. Innumerable hypoattenuating subcentimeter nodules   throughout the liver parenchyma.  BILE DUCTS: Normal caliber.  GALLBLADDER: Within normal limits.  SPLEEN: Within normal limits.  PANCREAS: Within normal limits.  ADRENALS: Within normal limits.  KIDNEYS/URETERS: Symmetric renal enhancement. No hydronephrosis.   Bilateral subcentimeter hypodensities too small to characterized.  BLADDER: Within normal limits.  REPRODUCTIVE ORGANS: Prostate is enlarged.  BOWEL: No bowel obstruction. Appendix is normal.  PERITONEUM/RETROPERITONEUM: Within normal limits.  VESSELS: Atherosclerotic changes. J shaped celiac axis with proximal   stenosis and poststenotic dilatation measuring 1.3 cm.  LYMPH NODES: No lymphadenopathy.  ABDOMINAL WALL: Small fat-containing periumbilical hernia.  BONES: Degenerative changes. Partially visualized cervical spinal fusion   hardware. L2 vertebral hemangioma.  IMPRESSION:  No acute pathology.  Hepatic steatosis. Innumerable hypoattenuating subcentimeter nodules   throughout the liver parenchyma, suggestive of regenerative nodules.   Recommend further evaluation with nonemergent outpatient abdominal MR.      SECONDARY PROCEDURE  Procedure: CT head  Findings and Treatment: PROCEDURE DATE:  08/22/2024    INTERPRETATION:    CLINICAL INFORMATION: Cognitive change.  TECHNIQUE: Multiple axial CT images of the head were obtained without contrast. Sagittal and coronal reconstructed images were acquired from the source data.  COMPARISON: No prior CT studies of the brain are available for comparison at this institution.  FINDINGS: There is no acute intracranial hemorrhage, mass effect, shift of the midline structures, herniation, extra-axial fluid collection, or hydrocephalus.  There is diffuse cerebral volume loss with prominence of the sulci,   fissures, and cisternal spaces which is normal for the patient's age.   There is mild deep and periventricular white matter hypoattenuation   statistically compatible with microvascular changes given calcific   atherosclerotic disease of the intracranial arteries.  Mucosal thickening and aerated secretions are seen within the left   maxillary sinus alveolar recess. Periapical radiolucency is partially   visualized above the roots of the left maxillary first molar tooth. A   focal tiny channel of dehiscence is seen along the maxillary sinus floor.   The remainder of the paranasal sinuses and tympanomastoid cavities are clear. The calvarium is intact. There is evidence of bilateral cataract removal.  IMPRESSION: No acute intracranial hemorrhage, mass effect, or shift of the midline structures.  Left-sided maxillary odontogenic sinusitis.     PRINCIPAL PROCEDURE  Procedure: CT abdomen pelvis  Findings and Treatment: CT ABDOMEN AND PELVIS IC  CT CHEST IC  PROCEDURE DATE:  08/22/2024    FINDINGS:  CHEST:  LUNGS AND LARGE AIRWAYS: Patent central airways. No pulmonary nodule.  PLEURA: No pleural effusion.  VESSELS: Coronary artery calcifications.  HEART: Heart size is normal. No pericardial effusion.  MEDIASTINUM AND SLIME: No lymphadenopathy.  CHEST WALL AND LOWER NECK: Within normal limits.  ABDOMEN AND PELVIS:  LIVER: Steatosis. Innumerable hypoattenuating subcentimeter nodules   throughout the liver parenchyma.  BILE DUCTS: Normal caliber.  GALLBLADDER: Within normal limits.  SPLEEN: Within normal limits.  PANCREAS: Within normal limits.  ADRENALS: Within normal limits.  KIDNEYS/URETERS: Symmetric renal enhancement. No hydronephrosis.   Bilateral subcentimeter hypodensities too small to characterized.  BLADDER: Within normal limits.  REPRODUCTIVE ORGANS: Prostate is enlarged.  BOWEL: No bowel obstruction. Appendix is normal.  PERITONEUM/RETROPERITONEUM: Within normal limits.  VESSELS: Atherosclerotic changes. J shaped celiac axis with proximal   stenosis and poststenotic dilatation measuring 1.3 cm.  LYMPH NODES: No lymphadenopathy.  ABDOMINAL WALL: Small fat-containing periumbilical hernia.  BONES: Degenerative changes. Partially visualized cervical spinal fusion hardware. L2 vertebral hemangioma.  IMPRESSION:  No acute pathology.  Hepatic steatosis. Innumerable hypoattenuating subcentimeter nodules throughout the liver parenchyma, suggestive of regenerative nodules. Recommend further evaluation with nonemergent outpatient abdominal MR.      SECONDARY PROCEDURE  Procedure: CT head  Findings and Treatment: PROCEDURE DATE:  08/22/2024    CLINICAL INFORMATION: Cognitive change.  FINDINGS: There is no acute intracranial hemorrhage, mass effect, shift of the midline structures, herniation, extra-axial fluid collection, or hydrocephalus.  There is diffuse cerebral volume loss with prominence of the sulci,   fissures, and cisternal spaces which is normal for the patient's age.   There is mild deep and periventricular white matter hypoattenuation   statistically compatible with microvascular changes given calcific   atherosclerotic disease of the intracranial arteries.  Mucosal thickening and aerated secretions are seen within the left   maxillary sinus alveolar recess. Periapical radiolucency is partially   visualized above the roots of the left maxillary first molar tooth. A   focal tiny channel of dehiscence is seen along the maxillary sinus floor.   The remainder of the paranasal sinuses and tympanomastoid cavities are clear. The calvarium is intact. There is evidence of bilateral cataract removal.  IMPRESSION: No acute intracranial hemorrhage, mass effect, or shift of the midline structures.  Left-sided maxillary odontogenic sinusitis.

## 2024-08-23 NOTE — DISCHARGE NOTE PROVIDER - CARE PROVIDER_API CALL
Fuad Garcia  Gastroenterology  Established Patient  Follow Up Time:    Esdras Broussard  Transplant Hepatology  [address] [place] [id number]  Phone: [phone]  Fax: [phone]  Follow Up Time: 1 week   Esdras Broussard  Transplant Hepatology  [address] [place] [id number]  Phone: [phone]  Fax: [phone]  Follow Up Time: 1 week    Omer Beltrán  Internal Medicine  [address] [place] [address]  Phone: [phone]  Fax: [phone]  Established Patient  Follow Up Time: 1 week

## 2024-08-23 NOTE — PROGRESS NOTE ADULT - PROBLEM SELECTOR PLAN 6
-Will hold home statin due to LFT elevation Diet: Halal/CC  Dispo: Pending PT Consult   DVT ppx: Heparin 5k TID

## 2024-08-23 NOTE — PROGRESS NOTE ADULT - ATTENDING COMMENTS
68 yo M w/ hx HTN, DM, hepatic steatosis ( recent admission for similar compliants at [facility] found to have elevated LFTs US reported as fatty liver left AMA) , alcohol use ( endorsed differing alcohol use; reported 2 beers/day but as per family has been found on the floor by family intoxicated) p/w lethargy and weakness. +N/V over past month non bloody with decreased PO intake. On day of admission presented to PCP with lethargy and confusion. + UA positive     # cirrhosis- CT C/A/P- with regenerative nodules ? early cirrhosis; no overt ascites; NH4 high end of normal; repeat NH3; monitor LFTs; f/u hepatology serology  # N/V- CT head w/o ICH; odontogenic sinusitis reports no oral pain sinus tenderness; poss alcoholic gastritis/UTI- PPI; monitor PO intake if no improvement EGD next week; f/u heaptology recs   # alcohol use- alcohol level <10; as per family has not drank in 22 days; MVI/folate; CIWA protocol currently approx 2; c/w PPI;   # UTI- UA+ denies any dsyuria; asymptomatic UTI in male; ceftriaxone; F/u Ucx   # BPH-  wife states chronically urinary frequency for years; CT A/P with enlarged prostate; start flomax;  # hyponatremia- urine lytes consistent with pre- renal state; start IVF   # elevated AG- increased BoH butyrate; likely due to starvation ketosis; lactate improved; AG resolved   # DM- A1c 7.7; non compliant due to N/V with home meds; ISS  # HTN- monitor off BP meds  # HLD - hold statin due to elevated LFTs

## 2024-08-23 NOTE — PROGRESS NOTE ADULT - PROBLEM SELECTOR PLAN 4
-Takes Metformin and Jardiance at home  Plan  -Hold PO medications inpatient  -Low Dose ISS -Patients SBP at normal limits- will hold Amlodipine. Family reports he has not been taking it  -Will restart if BP elevates -Takes Metformin and Jardiance at home  -A1C on 8/22 of 7.7    Plan  -Hold PO medications inpatient  -Low Dose ISS

## 2024-08-23 NOTE — DISCHARGE NOTE PROVIDER - HOSPITAL COURSE
HPI:  Patient is a 70 y/o male with pmh of HTN, DM, HLD and recently diagnosed hepatic steatosis presenting to the hospital due to lethargy. Patients daughter provided translation and collateral over the phone. She states that for around the past several weeks patient has been more fatigued at home and has had decreased PO intake. About a month ago he went to his PCP with these complaints and he was found to have hepatic steatosis. Since then, his family reports that he has appeared worse. He really has not been eating much and has been endorsing abdominal pain, nausea and vomiting intermittently. Due to worsening N/V the patient was brought to [facility] ED on 6/13  for further work up. RUQ U/S at that time was hepatic steatosis, no gallstones. AST//55 w/ TB 2.5, w/ initial infectious work up unremarkable. The patient was admitted for further work up however had AMAed after a few hours. The patient presented to Dr. Fuad Garcia today but was notably confused, lethargic and w/ significant abdominal distension prompting him to recommend pt to visit ED for further eval with concern for hepatic encephalopathy.     Family reports that patient has a hx of heavy alcohol use, but he never admits to the family how much he actually drinks. They report that for the past 2 weeks at least he has been home and has not been seen consuming any alcohol.     Hospital Course:  Patient seen by hepatology inpatient. ***    Important Medication Changes and Reason:  ***    Active or Pending Issues Requiring Follow-up:  - Outpatient MRI abdomen to further characterize numerous subcentimeter nodules seen on CTAP    Advanced Directives:   [X] Full code  [ ] DNR  [ ] Hospice    Discharge Diagnoses:  - Cirrhosis  ***       HPI:  Patient is a 70 y/o male with pmh of HTN, DM, HLD and recently diagnosed hepatic steatosis presenting to the hospital due to lethargy. Patients daughter provided translation and collateral over the phone. She states that for around the past several weeks patient has been more fatigued at home and has had decreased PO intake. About a month ago he went to his PCP with these complaints and he was found to have hepatic steatosis. Since then, his family reports that he has appeared worse. He really has not been eating much and has been endorsing abdominal pain, nausea and vomiting intermittently. Due to worsening N/V the patient was brought to [facility] ED on 6/13  for further work up. RUQ U/S at that time was hepatic steatosis, no gallstones. AST//55 w/ TB 2.5, w/ initial infectious work up unremarkable. The patient was admitted for further work up however had AMAed after a few hours. The patient presented to Dr. Fuad Garcia today but was notably confused, lethargic and w/ significant abdominal distension prompting him to recommend pt to visit ED for further eval with concern for hepatic encephalopathy.     Family reports that patient has a hx of heavy alcohol use, but he never admits to the family how much he actually drinks. They report that for the past 2 weeks at least he has been home and has not been seen consuming any alcohol.     Hospital Course:  Patient seen by hepatology inpatient. CTAP on 8/22 showed Hepatic steatosis; Innumerable hypoattenuating subcentimeter nodules throughout the liver parenchyma, suggestive of regenerative nodules. Workup including hepatitis panel and EGD were negative. Patient is safe for discharge from hepatology standpoint, with outpatient follow-up.    Patient had poor tolerance of food by mouth while in the hospital. He vomited after eating solid food. He was trialed on clear liquids, then a full liquid diet, then a regular diet. He was scheduled to receive a gastric emptying study on 8/30 to evaluate for gastroparesis, but stated that he was too weak to transfer from the bed to the chair required for the study. He verbalized a desire to go home, and that he would regain strength and get better if he were to go home.    The patient is afebrile, hemodynamically stable and medically optimized for discharge to home with follow up with a [facility] hepatologist as well as his primary care physician. On day of discharge, patient is clinically stable with no new exam findings or acute symptoms compared to prior. The patient was seen by the attending physician on the date of discharge and deemed stable and acceptable for discharge. The patient's chronic medical conditions were treated accordingly per the patient's home medication regimen. The patient's medication reconciliation (with changes made to chronic medications), follow up appointments, discharge orders, instructions, and significant lab and diagnostic studies are as noted.    Important Medication Changes and Reason:  ***    Active or Pending Issues Requiring Follow-up:  - [facility] will call patient to schedule an appointment. If patient doesn't hear back in 1 week, please call this number: [phone]  - Outpatient MRI abdomen to further characterize numerous subcentimeter nodules seen on CTAP    Advanced Directives: Full code    Discharge Diagnoses:  - Hepatic steatosis  - Mild alcoholic hepatitis       HPI:  Patient is a 68 y/o male with pmh of HTN, DM, HLD and recently diagnosed hepatic steatosis presenting to the hospital due to lethargy. Patients daughter states that for around the past several weeks patient has been more fatigued at home and has had decreased PO intake. About a month ago he went to his PCP with these complaints and he was found to have hepatic steatosis. Since then, his family reports that he has appeared worse. He really has not been eating much and has been endorsing abdominal pain, nausea and vomiting intermittently. Due to worsening N/V the patient was brought to [facility] ED on 6/13  for further work up. RUQ U/S at that time was hepatic steatosis, no gallstones. AST//55 w/ TB 2.5, w/ initial infectious work up unremarkable. The patient was admitted for further work up however had AMAed after a few hours. The patient presented to Dr. Fuad Garcia today but was notably confused, lethargic and w/ significant abdominal distension prompting him to recommend pt to visit ED for further eval with concern for hepatic encephalopathy.     Family reports that patient has a hx of heavy alcohol use, but he never admits to the family how much he actually drinks. They report that for the past 2 weeks at least he has been home and has not been seen consuming any alcohol.     Hospital Course:  Patient seen by hepatology inpatient. CTAP on 8/22 showed Hepatic steatosis; Innumerable hypoattenuating subcentimeter nodules throughout the liver parenchyma, suggestive of regenerative nodules. Workup including hepatitis panel and EGD were negative. Patient is safe for discharge from hepatology standpoint, with outpatient follow-up.    Patient had poor tolerance of food by mouth while in the hospital. He vomited after eating solid food. He was trialed on clear liquids, then a full liquid diet, then a regular diet. He was scheduled to receive a gastric emptying study on 8/30 to evaluate for gastroparesis, but stated that he was too weak to transfer from bed to chair required for the study. He verbalized a desire to go home, and that he would regain strength and get better if he were to go home.    The patient is afebrile, hemodynamically stable and medically optimized for discharge to home with follow up with a hepatologist as well as his primary care physician. On day of discharge, patient is clinically stable with no new exam findings or acute symptoms compared to prior. The patient was seen by the attending physician on the date of discharge and deemed stable and acceptable for discharge. The patient's chronic medical conditions were treated accordingly per the patient's home medication regimen. The patient's medication reconciliation (with changes made to chronic medications), follow up appointments, discharge orders, instructions, and significant lab and diagnostic studies are as noted.    Important Medication Changes and Reason:  Reglan 5mg TID before meals    Active or Pending Issues Requiring Follow-up:  - [facility] will call patient to schedule an appointment. If patient doesn't hear back in 1 week, please call this number: [phone]  - Outpatient MRI abdomen to further characterize numerous subcentimeter nodules seen on CTAP    Advanced Directives:  Full code    Discharge Diagnoses:  - Hepatic steatosis  - Mild alcoholic hepatitis       HPI:  Patient is a 68 y/o male with pmh of HTN, DM, HLD and recently diagnosed hepatic steatosis presenting to the hospital due to lethargy. Patients daughter states that for around the past several weeks patient has been more fatigued at home and has had decreased PO intake. About a month ago he went to his PCP with these complaints and he was found to have hepatic steatosis. Since then, his family reports that he has appeared worse. He really has not been eating much and has been endorsing abdominal pain, nausea and vomiting intermittently. Due to worsening N/V the patient was brought to [facility] ED on 6/13  for further work up. RUQ U/S at that time was hepatic steatosis, no gallstones. AST//55 w/ TB 2.5, w/ initial infectious work up unremarkable. The patient was admitted for further work up however had AMAed after a few hours. The patient presented to Dr. Fuad Garcia today but was notably confused, lethargic and w/ significant abdominal distension prompting him to recommend pt to visit ED for further eval with concern for hepatic encephalopathy.     Hospital Course:  Patient seen by hepatology inpatient. CTAP on 8/22 showed Hepatic steatosis; Innumerable hypoattenuating subcentimeter nodules throughout the liver parenchyma, suggestive of regenerative nodules. Workup including hepatitis panel and EGD were negative. Patient is safe for discharge from hepatology standpoint, with outpatient follow-up.    Patient had poor tolerance of food by mouth while in the hospital. He vomited after eating solid food. He was trialed on clear liquids, then a full liquid diet, then a regular diet. He was scheduled to receive a gastric emptying study on 8/30 to evaluate for gastroparesis, but stated that he was too weak to transfer from bed to chair required for the study. He verbalized a desire to go home, and that he would regain strength and get better if he were to go home.    The patient is afebrile, hemodynamically stable and medically optimized for discharge to home with follow up with a hepatologist as well as his primary care physician. On day of discharge, patient is clinically stable with no new exam findings or acute symptoms compared to prior. The patient was seen by the attending physician on the date of discharge and deemed stable and acceptable for discharge. The patient's chronic medical conditions were treated accordingly per the patient's home medication regimen. The patient's medication reconciliation (with changes made to chronic medications), follow up appointments, discharge orders, instructions, and significant lab and diagnostic studies are as noted.    Important Medication Changes and Reason:  Reglan 5 mg three times a day before meals    Active or Pending Issues Requiring Follow-up:  - [facility] will call patient to schedule an appointment. If patient doesn't hear back in 1 week, please call this number: [phone]  - Outpatient MRI abdomen to further characterize numerous subcentimeter nodules seen on CTAP    Advanced Directives:  Full code    Discharge Diagnoses:  - Hepatic steatosis  - Mild alcoholic hepatitis

## 2024-08-23 NOTE — PATIENT PROFILE ADULT - FALL HARM RISK - RISK INTERVENTIONS

## 2024-08-23 NOTE — PROGRESS NOTE ADULT - PROBLEM SELECTOR PLAN 3
-Patient presenting with Bicarb 20 and AG-16  -Likely starvation ketosis, but will also consider DKA  Plan  -Ordered VBG and Beta-Hydroxybutyrate -Takes Metformin and Jardiance at home  -A1C on 8/22 of 7.7    Plan  -Hold PO medications inpatient  -Low Dose ISS -Patient admits to alcohol use and family also report that he has consumed alcohol heavily in the past  -Family state he has not been seen drinking in 2 weeks     Plan  -Will start Thiamine and Folate   -Will order Low Risk CIWA

## 2024-08-23 NOTE — DISCHARGE NOTE PROVIDER - CARE PROVIDERS DIRECT ADDRESSES
[email or web address]

## 2024-08-23 NOTE — DISCHARGE NOTE PROVIDER - NSDCCPCAREPLAN_GEN_ALL_CORE_FT
PRINCIPAL DISCHARGE DIAGNOSIS  Diagnosis: Cirrhosis  Assessment and Plan of Treatment: You were found to have increases in your liver function enzymes. Your CAT scan of your abdomen also showed changes to your liver. You need to continue to follow-up outpatient with your hepatologist for furhter management of your cirrhosis.      SECONDARY DISCHARGE DIAGNOSES  Diagnosis: Acute UTI  Assessment and Plan of Treatment: While you were in the hospital, we found an infection in your urine and treated you with antibiotics.     PRINCIPAL DISCHARGE DIAGNOSIS  Diagnosis: Cirrhosis  Assessment and Plan of Treatment: You were found to have increases in your liver function enzymes. Your CAT scan of your abdomen also showed changes to your liver. You will need to continue to follow-up outpatient with a hepatologist for further workup and management of your liver dysfunction. [facility] [place] will call you to schedule an appointment. If you don't hear back in 1 week, please call this number: [phone]      SECONDARY DISCHARGE DIAGNOSES  Diagnosis: Acute UTI  Assessment and Plan of Treatment: While you were in the hospital, we found an infection in your urine and treated you with antibiotics. Your infection resolved. If you experience pain with urination or urinary urgency or frequency, please call your primary care physician. If you experience these symptoms, as well as fever or chills, please go to the emergency room.

## 2024-08-23 NOTE — PROGRESS NOTE ADULT - SUBJECTIVE AND OBJECTIVE BOX
Loida Simmons MD  EM/IM PGY-1  TEAMS  -----------------------------------------    INTERVAL HPI/OVERNIGHT EVENTS: Patient admitted yesterday, no acute overnight events.    SUBJECTIVE: Patient seen and examined at bedside,  utilized (ID: [id number]). Patient states he has nausea and intermittent vomiting, with last episode of emesis day before yesterday. He states he is unable to eat food more than once every 24 hours as after he eats a little bit he immediately throws up. He states he has been unable to walk secondary to weakness in his legs. No current pain or acute complaints. He reports his last bowel movement was this morning.    VITAL SIGNS:  T(C): 36.8 (08-23-24 @ 05:12), Max: 37.1 (08-22-24 @ 15:42)  HR: 80 (08-23-24 @ 05:12) (75 - 93)  BP: 107/60 (08-23-24 @ 05:12) (103/63 - 123/64)  RR: 17 (08-23-24 @ 05:12) (16 - 18)  SpO2: 98% (08-23-24 @ 05:12) (96% - 98%)    PHYSICAL EXAM:  CONSTITUTIONAL: NAD   HEAD: Normocephalic; atraumatic  EYES: EOMI  NECK: Trachea midline  CV: Normal S1, S2; no audible murmurs  RESP: normal work of breathing; CTAB   ABD: soft, non-distended; non-tender to palpation, reducible umbilical hernia  MSK/EXT: no LE edema, no limited ROM  NEURO: Moves all extremities spontaneously with no focal deficits, speech is appropriate, no tremor in bilateral hands  PSYCH: well kempt, calm, interactive    MEDICATIONS:  MEDICATIONS  (STANDING):  cefTRIAXone   IVPB 1000 milliGRAM(s) IV Intermittent every 24 hours  chlorhexidine 2% Cloths 1 Application(s) Topical daily  dextrose 5%. 1000 milliLiter(s) (50 mL/Hr) IV Continuous <Continuous>  dextrose 5%. 1000 milliLiter(s) (100 mL/Hr) IV Continuous <Continuous>  dextrose 50% Injectable 25 Gram(s) IV Push once  dextrose 50% Injectable 12.5 Gram(s) IV Push once  dextrose 50% Injectable 25 Gram(s) IV Push once  folic acid Injectable 1 milliGRAM(s) IV Push daily  glucagon  Injectable 1 milliGRAM(s) IntraMuscular once  heparin   Injectable 5000 Unit(s) SubCutaneous every 8 hours  insulin lispro (ADMELOG) corrective regimen sliding scale   SubCutaneous at bedtime  insulin lispro (ADMELOG) corrective regimen sliding scale   SubCutaneous three times a day before meals  multivitamin 1 Tablet(s) Oral daily  pantoprazole  Injectable 40 milliGRAM(s) IV Push daily  thiamine IVPB 500 milliGRAM(s) IV Intermittent daily    MEDICATIONS  (PRN):  dextrose Oral Gel 15 Gram(s) Oral once PRN Blood Glucose LESS THAN 70 milliGRAM(s)/deciliter  LORazepam     Tablet 1 milliGRAM(s) Oral every 1 hour PRN CIWA-Ar score 8 or greater  LORazepam     Tablet 1 milliGRAM(s) Oral every 2 hours PRN CIWA-Ar score increase by 2 points and a total score of 7 or less  ondansetron Injectable 8 milliGRAM(s) IV Push every 8 hours PRN Nausea and/or Vomiting      LABS:                        14.7   8.97  )-----------( 239      ( 23 Aug 2024 07:20 )             43.6     08-23    134<L>  |  99  |  10  ----------------------------<  96  4.1   |  21<L>  |  0.94    Ca    8.6      23 Aug 2024 07:20  Phos  2.8     08-23  Mg     1.70     08-23    TPro  6.3  /  Alb  2.7<L>  /  TBili  3.7<H>  /  DBili  x   /  AST  90<H>  /  ALT  27  /  AlkPhos  173<H>  08-23    PT/INR - ( 23 Aug 2024 07:20 )   PT: 16.4 sec;   INR: 1.48 ratio         PTT - ( 22 Aug 2024 14:27 )  PTT:39.5 sec  Urinalysis Basic - ( 23 Aug 2024 07:20 )    Color: x / Appearance: x / SG: x / pH: x  Gluc: 96 mg/dL / Ketone: x  / Bili: x / Urobili: x   Blood: x / Protein: x / Nitrite: x   Leuk Esterase: x / RBC: x / WBC x   Sq Epi: x / Non Sq Epi: x / Bacteria: x

## 2024-08-23 NOTE — PHYSICAL THERAPY INITIAL EVALUATION ADULT - ADDITIONAL COMMENTS
Pt left semisupine in bed in NAD, all lines intact, call bell in reach, SPO2 98%, bed alarm on, and RN aware. Picato Counseling:  I discussed with the patient the risks of Picato including but not limited to erythema, scaling, itching, weeping, crusting, and pain.

## 2024-08-23 NOTE — PROGRESS NOTE ADULT - ASSESSMENT
Loida Simmons MD  EM/IM PGY-1  TEAMS  -----------------------------------------    ================== UNFINISHED NOTE =======================    INTERVAL HPI/OVERNIGHT EVENTS:    SUBJECTIVE: Patient seen and examined at bedside.     VITAL SIGNS:  T(C): 36.8 (24 @ 05:12), Max: 37.1 (24 @ 15:42)  HR: 80 (24 @ 05:12) (75 - 93)  BP: 107/60 (24 @ 05:12) (103/63 - 123/64)  RR: 17 (24 @ 05:12) (16 - 18)  SpO2: 98% (24 @ 05:12) (96% - 98%)    PHYSICAL EXAM:        MEDICATIONS:  MEDICATIONS  (STANDING):  chlorhexidine 2% Cloths 1 Application(s) Topical daily  dextrose 5%. 1000 milliLiter(s) (100 mL/Hr) IV Continuous <Continuous>  dextrose 5%. 1000 milliLiter(s) (50 mL/Hr) IV Continuous <Continuous>  dextrose 50% Injectable 25 Gram(s) IV Push once  dextrose 50% Injectable 12.5 Gram(s) IV Push once  dextrose 50% Injectable 25 Gram(s) IV Push once  folic acid Injectable 1 milliGRAM(s) IV Push daily  glucagon  Injectable 1 milliGRAM(s) IntraMuscular once  heparin   Injectable 5000 Unit(s) SubCutaneous every 8 hours  insulin lispro (ADMELOG) corrective regimen sliding scale   SubCutaneous three times a day before meals  insulin lispro (ADMELOG) corrective regimen sliding scale   SubCutaneous at bedtime  multivitamin 1 Tablet(s) Oral daily  pantoprazole  Injectable 40 milliGRAM(s) IV Push daily  thiamine IVPB 500 milliGRAM(s) IV Intermittent daily    MEDICATIONS  (PRN):  dextrose Oral Gel 15 Gram(s) Oral once PRN Blood Glucose LESS THAN 70 milliGRAM(s)/deciliter  LORazepam     Tablet 1 milliGRAM(s) Oral every 2 hours PRN CIWA-Ar score increase by 2 points and a total score of 7 or less  LORazepam     Tablet 1 milliGRAM(s) Oral every 1 hour PRN CIWA-Ar score 8 or greater  ondansetron Injectable 8 milliGRAM(s) IV Push every 8 hours PRN Nausea and/or Vomiting      LABS:                        14.7   8.97  )-----------( 239      ( 23 Aug 2024 07:20 )             43.6     08-22    131<L>  |  95<L>  |  9   ----------------------------<  150<H>  4.3   |  20<L>  |  1.08    Ca    8.7      22 Aug 2024 14:27  Phos  2.9     08-22  Mg     1.60         TPro  6.6  /  Alb  2.8<L>  /  TBili  3.8<H>  /  DBili  2.6<H>  /  AST  99<H>  /  ALT  29  /  AlkPhos  180<H>      PT/INR - ( 23 Aug 2024 07:20 )   PT: 16.4 sec;   INR: 1.48 ratio         PTT - ( 22 Aug 2024 14:27 )  PTT:39.5 sec  Urinalysis Basic - ( 23 Aug 2024 00:13 )    Color: Dark Yellow / Appearance: Clear / S.084 / pH: x  Gluc: x / Ketone: Trace mg/dL  / Bili: Moderate / Urobili: 1.0 mg/dL   Blood: x / Protein: 30 mg/dL / Nitrite: Positive   Leuk Esterase: Small / RBC: 4 /HPF / WBC 78 /HPF   Sq Epi: x / Non Sq Epi: 0 /HPF / Bacteria: Many /HPF         Patient is a 70 y/o male with pmh of HTN, DM, HLD and recently diagnosed hepatic steatosis presenting to the hospital due to abnormal liver enzymes. 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 Pending further workup with Hepatology.

## 2024-08-23 NOTE — PROGRESS NOTE ADULT - SUBJECTIVE AND OBJECTIVE BOX
Patient is a 69y old  Male who presents with a chief complaint of Elevated LFTS (23 Aug 2024 08:01)      Interval Events:   CT A/P w/ evidence of hepatic steatosis with possible regenerative nodules. No evidence of ascites. UA + for bacteria, patient started on CTX for empiric tx. The patient this AM reports still having generalized fatigue. He reportst that he wants to eat but whenever he tries he feels nauseous and wants to throw up so he tries not to eat anything except for juice. Denies any abdominal pain, burning sensation or bloating. He is hesitant to try eating due to prior symptoms.     ROS:   A 12-point ROS was performed and negative except as noted in HPI.    Hospital Medications:  cefTRIAXone   IVPB 1000 milliGRAM(s) IV Intermittent every 24 hours  chlorhexidine 2% Cloths 1 Application(s) Topical daily  dextrose 5%. 1000 milliLiter(s) IV Continuous <Continuous>  dextrose 5%. 1000 milliLiter(s) IV Continuous <Continuous>  dextrose 50% Injectable 25 Gram(s) IV Push once  dextrose 50% Injectable 12.5 Gram(s) IV Push once  dextrose 50% Injectable 25 Gram(s) IV Push once  dextrose Oral Gel 15 Gram(s) Oral once PRN  folic acid Injectable 1 milliGRAM(s) IV Push daily  glucagon  Injectable 1 milliGRAM(s) IntraMuscular once  heparin   Injectable 5000 Unit(s) SubCutaneous every 8 hours  insulin lispro (ADMELOG) corrective regimen sliding scale   SubCutaneous at bedtime  insulin lispro (ADMELOG) corrective regimen sliding scale   SubCutaneous three times a day before meals  LORazepam     Tablet 1 milliGRAM(s) Oral every 1 hour PRN  LORazepam     Tablet 1 milliGRAM(s) Oral every 2 hours PRN  multivitamin 1 Tablet(s) Oral daily  ondansetron Injectable 8 milliGRAM(s) IV Push every 8 hours PRN  pantoprazole  Injectable 40 milliGRAM(s) IV Push daily  thiamine IVPB 500 milliGRAM(s) IV Intermittent daily      PHYSICAL EXAM:   Vital Signs:  Vital Signs Last 24 Hrs  T(C): 36.8 (23 Aug 2024 05:12), Max: 37.1 (22 Aug 2024 15:42)  T(F): 98.2 (23 Aug 2024 05:12), Max: 98.7 (22 Aug 2024 15:42)  HR: 80 (23 Aug 2024 05:12) (75 - 93)  BP: 107/60 (23 Aug 2024 05:12) (103/63 - 123/64)  BP(mean): --  RR: 17 (23 Aug 2024 05:12) (16 - 18)  SpO2: 98% (23 Aug 2024 05:12) (96% - 98%)    Parameters below as of 23 Aug 2024 05:12  Patient On (Oxygen Delivery Method): room air      Daily Height in cm: 172.72 (22 Aug 2024 12:27)    Daily     GENERAL: no acute distress  NEURO: alert  HEENT: anicteric sclera, no conjunctival pallor appreciated  CHEST: no respiratory distress, no accessory muscle use  CARDIAC: regular rate  ABDOMEN: soft, nondistended, nontender, no rebound or guarding  EXTREMITIES: b/l LE edema up to ankles   SKIN: no lesions noted    LABS: reviewed                        14.7   8.97  )-----------( 239      ( 23 Aug 2024 07:20 )             43.6     08-23    134<L>  |  99  |  10  ----------------------------<  96  4.1   |  21<L>  |  0.94    Ca    8.6      23 Aug 2024 07:20  Phos  2.8     08-23  Mg     1.70     08-23    TPro  6.3  /  Alb  2.7<L>  /  TBili  3.7<H>  /  DBili  x   /  AST  90<H>  /  ALT  27  /  AlkPhos  173<H>  08-23    LIVER FUNCTIONS - ( 23 Aug 2024 07:20 )  Alb: 2.7 g/dL / Pro: 6.3 g/dL / ALK PHOS: 173 U/L / ALT: 27 U/L / AST: 90 U/L / GGT: x             Interval Diagnostic Studies: see sunrise for full report

## 2024-08-23 NOTE — PROGRESS NOTE ADULT - PROBLEM SELECTOR PLAN 2
-Patient admits to alcohol use and family also report that he has consumed alcohol heavily in the past  -Family state he has not been seen drinking in 2 weeks   Plan  -Will start Thiamine and Folate   -Will order Low Risk CIWA   -Order EtOH and PETH levels- if elevated will start standing taper -Patient admits to alcohol use and family also report that he has consumed alcohol heavily in the past  -Family state he has not been seen drinking in 2 weeks     Plan  -Will start Thiamine and Folate   -Will order Low Risk CIWA + UA with chronic urinary frequency  CT A/P with enlarged prostate  f/u UCx   ceftriaxone started

## 2024-08-23 NOTE — PHYSICAL THERAPY INITIAL EVALUATION ADULT - ACTIVE RANGE OF MOTION EXAMINATION, REHAB EVAL
alex. upper extremity Active ROM was WNL (within normal limits)/bilateral lower extremity Active ROM was WNL (within normal limits)

## 2024-08-24 DIAGNOSIS — R10.9 UNSPECIFIED ABDOMINAL PAIN: ICD-10-CM

## 2024-08-24 LAB
AFP-TM SERPL-MCNC: 3.3 NG/ML — SIGNIFICANT CHANGE UP
ALBUMIN SERPL ELPH-MCNC: 3.5 G/DL — SIGNIFICANT CHANGE UP (ref 3.3–5)
ALP SERPL-CCNC: 156 U/L — HIGH (ref 40–120)
ALT FLD-CCNC: 23 U/L — SIGNIFICANT CHANGE UP (ref 4–41)
AMPHETAMINES UR QL SCN: NEGATIVE NG/ML — SIGNIFICANT CHANGE UP
ANION GAP SERPL CALC-SCNC: 15 MMOL/L — HIGH (ref 7–14)
AST SERPL-CCNC: 88 U/L — HIGH (ref 4–40)
BARBITURATES UR QL SCN: NEGATIVE NG/ML — SIGNIFICANT CHANGE UP
BARBITURATES UR-MCNC: NEGATIVE NG/ML — SIGNIFICANT CHANGE UP
BASOPHILS # BLD AUTO: 0.07 K/UL — SIGNIFICANT CHANGE UP (ref 0–0.2)
BASOPHILS NFR BLD AUTO: 0.8 % — SIGNIFICANT CHANGE UP (ref 0–2)
BENZODIAZ UR QL: NEGATIVE NG/ML — SIGNIFICANT CHANGE UP
BILIRUB SERPL-MCNC: 4 MG/DL — HIGH (ref 0.2–1.2)
BUN SERPL-MCNC: 8 MG/DL — SIGNIFICANT CHANGE UP (ref 7–23)
BZE UR QL: NEGATIVE NG/ML — SIGNIFICANT CHANGE UP
CALCIUM SERPL-MCNC: 8.9 MG/DL — SIGNIFICANT CHANGE UP (ref 8.4–10.5)
CANNABINOIDS UR QL SCN: NEGATIVE NG/ML — SIGNIFICANT CHANGE UP
CHLORIDE SERPL-SCNC: 99 MMOL/L — SIGNIFICANT CHANGE UP (ref 98–107)
CO2 SERPL-SCNC: 21 MMOL/L — LOW (ref 22–31)
CREAT SERPL-MCNC: 0.95 MG/DL — SIGNIFICANT CHANGE UP (ref 0.5–1.3)
EGFR: 88 ML/MIN/1.73M2 — SIGNIFICANT CHANGE UP
EOSINOPHIL # BLD AUTO: 0.2 K/UL — SIGNIFICANT CHANGE UP (ref 0–0.5)
EOSINOPHIL NFR BLD AUTO: 2.2 % — SIGNIFICANT CHANGE UP (ref 0–6)
ETHANOL UR-MCNC: NEGATIVE % — SIGNIFICANT CHANGE UP
GLUCOSE SERPL-MCNC: 116 MG/DL — HIGH (ref 70–99)
HCT VFR BLD CALC: 40.2 % — SIGNIFICANT CHANGE UP (ref 39–50)
HGB BLD-MCNC: 13.8 G/DL — SIGNIFICANT CHANGE UP (ref 13–17)
IANC: 6.28 K/UL — SIGNIFICANT CHANGE UP (ref 1.8–7.4)
IMM GRANULOCYTES NFR BLD AUTO: 0.5 % — SIGNIFICANT CHANGE UP (ref 0–0.9)
INR BLD: 1.51 RATIO — HIGH (ref 0.85–1.18)
LYMPHOCYTES # BLD AUTO: 1.59 K/UL — SIGNIFICANT CHANGE UP (ref 1–3.3)
LYMPHOCYTES # BLD AUTO: 17.4 % — SIGNIFICANT CHANGE UP (ref 13–44)
MAGNESIUM SERPL-MCNC: 1.8 MG/DL — SIGNIFICANT CHANGE UP (ref 1.6–2.6)
MCHC RBC-ENTMCNC: 31.6 PG — SIGNIFICANT CHANGE UP (ref 27–34)
MCHC RBC-ENTMCNC: 34.3 GM/DL — SIGNIFICANT CHANGE UP (ref 32–36)
MCV RBC AUTO: 92 FL — SIGNIFICANT CHANGE UP (ref 80–100)
MELD SCORE WITH DIALYSIS: 31 POINTS — SIGNIFICANT CHANGE UP
MELD SCORE WITHOUT DIALYSIS: 18 POINTS — SIGNIFICANT CHANGE UP
METHADONE UR QL SCN: NEGATIVE NG/ML — SIGNIFICANT CHANGE UP
METHAQUALONE UR QL: NEGATIVE NG/ML — SIGNIFICANT CHANGE UP
METHAQUALONE UR-MCNC: NEGATIVE NG/ML — SIGNIFICANT CHANGE UP
MONOCYTES # BLD AUTO: 0.95 K/UL — HIGH (ref 0–0.9)
MONOCYTES NFR BLD AUTO: 10.4 % — SIGNIFICANT CHANGE UP (ref 2–14)
NEUTROPHILS # BLD AUTO: 6.28 K/UL — SIGNIFICANT CHANGE UP (ref 1.8–7.4)
NEUTROPHILS NFR BLD AUTO: 68.7 % — SIGNIFICANT CHANGE UP (ref 43–77)
NRBC # BLD: 0 /100 WBCS — SIGNIFICANT CHANGE UP (ref 0–0)
NRBC # FLD: 0 K/UL — SIGNIFICANT CHANGE UP (ref 0–0)
OPIATES UR QL: NEGATIVE NG/ML — SIGNIFICANT CHANGE UP
PCP UR QL: NEGATIVE NG/ML — SIGNIFICANT CHANGE UP
PHOSPHATE SERPL-MCNC: 2.5 MG/DL — SIGNIFICANT CHANGE UP (ref 2.5–4.5)
PLATELET # BLD AUTO: 242 K/UL — SIGNIFICANT CHANGE UP (ref 150–400)
POTASSIUM SERPL-MCNC: 4 MMOL/L — SIGNIFICANT CHANGE UP (ref 3.5–5.3)
POTASSIUM SERPL-SCNC: 4 MMOL/L — SIGNIFICANT CHANGE UP (ref 3.5–5.3)
PROPOXYPH UR QL: NEGATIVE NG/ML — SIGNIFICANT CHANGE UP
PROT SERPL-MCNC: 6.7 G/DL — SIGNIFICANT CHANGE UP (ref 6–8.3)
PROTHROM AB SERPL-ACNC: 16.9 SEC — HIGH (ref 9.5–13)
RBC # BLD: 4.37 M/UL — SIGNIFICANT CHANGE UP (ref 4.2–5.8)
RBC # FLD: 15.7 % — HIGH (ref 10.3–14.5)
SODIUM SERPL-SCNC: 135 MMOL/L — SIGNIFICANT CHANGE UP (ref 135–145)
WBC # BLD: 9.14 K/UL — SIGNIFICANT CHANGE UP (ref 3.8–10.5)
WBC # FLD AUTO: 9.14 K/UL — SIGNIFICANT CHANGE UP (ref 3.8–10.5)

## 2024-08-24 PROCEDURE — 99232 SBSQ HOSP IP/OBS MODERATE 35: CPT | Mod: GC

## 2024-08-24 RX ADMIN — TAMSULOSIN HYDROCHLORIDE 0.4 MILLIGRAM(S): 0.4 CAPSULE ORAL at 22:54

## 2024-08-24 RX ADMIN — Medication 100 MILLIGRAM(S): at 10:32

## 2024-08-24 RX ADMIN — Medication 40 MILLIGRAM(S): at 11:57

## 2024-08-24 RX ADMIN — ALBUMIN (HUMAN) 50 MILLILITER(S): 5 SOLUTION INTRAVENOUS at 00:45

## 2024-08-24 RX ADMIN — Medication 1 TABLET(S): at 11:57

## 2024-08-24 RX ADMIN — Medication 5000 UNIT(S): at 15:44

## 2024-08-24 RX ADMIN — ONDANSETRON 4 MILLIGRAM(S): 2 INJECTION, SOLUTION INTRAMUSCULAR; INTRAVENOUS at 22:54

## 2024-08-24 RX ADMIN — Medication 105 MILLIGRAM(S): at 11:57

## 2024-08-24 RX ADMIN — Medication 3 MILLIGRAM(S): at 22:54

## 2024-08-24 RX ADMIN — Medication 1 APPLICATION(S): at 05:56

## 2024-08-24 RX ADMIN — ONDANSETRON 4 MILLIGRAM(S): 2 INJECTION, SOLUTION INTRAMUSCULAR; INTRAVENOUS at 05:56

## 2024-08-24 RX ADMIN — CHLORHEXIDINE GLUCONATE 1 APPLICATION(S): 40 SOLUTION TOPICAL at 11:57

## 2024-08-24 RX ADMIN — Medication 1 APPLICATION(S): at 17:00

## 2024-08-24 RX ADMIN — POLYETHYLENE GLYCOL 3350 17 GRAM(S): 17 POWDER, FOR SOLUTION ORAL at 11:57

## 2024-08-24 RX ADMIN — Medication 1 MILLIGRAM(S): at 11:57

## 2024-08-24 RX ADMIN — ALBUMIN (HUMAN) 50 MILLILITER(S): 5 SOLUTION INTRAVENOUS at 05:56

## 2024-08-24 RX ADMIN — Medication 5000 UNIT(S): at 05:56

## 2024-08-24 RX ADMIN — Medication 5000 UNIT(S): at 22:54

## 2024-08-24 RX ADMIN — ONDANSETRON 4 MILLIGRAM(S): 2 INJECTION, SOLUTION INTRAMUSCULAR; INTRAVENOUS at 17:00

## 2024-08-24 NOTE — PROGRESS NOTE ADULT - PROBLEM SELECTOR PLAN 1
CT A/P w/o evidence of structural abnromalities or gastric wall thickening. No hx of dysphagia or odynophagia. Would recommend PO challenge as tolerated, if unable to tolerate regular diet, can attempt liquid diet. If no significant improvement throughout weekend, may have to consider endoscopic evaluation.   - continues to vomit after solid feeds  - will consider EGD per GI if lack improvement

## 2024-08-24 NOTE — PROGRESS NOTE ADULT - PROBLEM SELECTOR PLAN 5
-Takes Metformin and Jardiance at home  -A1C on 8/22 of 7.7    Plan  -Hold PO medications inpatient  -Low Dose ISS

## 2024-08-24 NOTE — PROGRESS NOTE ADULT - PROBLEM SELECTOR PLAN 6
-Patients SBP at normal limits- will hold Amlodipine. Family reports he has not been taking it  -Will restart if BP elevates

## 2024-08-24 NOTE — PROGRESS NOTE ADULT - PROBLEM SELECTOR PLAN 4
-Patient admits to alcohol use and family also report that he has consumed alcohol heavily in the past  -Family state he has not been seen drinking in 2 weeks     Plan  -Will start Thiamine and Folate   -Will order Low Risk CIWA

## 2024-08-24 NOTE — PROGRESS NOTE ADULT - SUBJECTIVE AND OBJECTIVE BOX
Jared Reynolds MD  Emergency Medicine & Internal Medicine PGY-3    PROGRESS NOTE:    ID #: [id number]    Patient is a 69y old  Male who presents with a chief complaint of Elevated LFTS (23 Aug 2024 13:33)    SUBJECTIVE / OVERNIGHT EVENTS: Did not tolerate solid PO for dinner, vomited afterwards. Otherwise denies other acute complaints including fever, cp, sob, abd pain, diarrhea      MEDICATIONS  (STANDING):  cefTRIAXone   IVPB 1000 milliGRAM(s) IV Intermittent every 24 hours  chlorhexidine 2% Cloths 1 Application(s) Topical daily  dextrose 5%. 1000 milliLiter(s) (100 mL/Hr) IV Continuous <Continuous>  dextrose 5%. 1000 milliLiter(s) (50 mL/Hr) IV Continuous <Continuous>  dextrose 50% Injectable 25 Gram(s) IV Push once  dextrose 50% Injectable 12.5 Gram(s) IV Push once  dextrose 50% Injectable 25 Gram(s) IV Push once  folic acid Injectable 1 milliGRAM(s) IV Push daily  glucagon  Injectable 1 milliGRAM(s) IntraMuscular once  heparin   Injectable 5000 Unit(s) SubCutaneous every 8 hours  insulin lispro (ADMELOG) corrective regimen sliding scale   SubCutaneous at bedtime  insulin lispro (ADMELOG) corrective regimen sliding scale   SubCutaneous three times a day before meals  lactated ringers. 1000 milliLiter(s) (100 mL/Hr) IV Continuous <Continuous>  melatonin 3 milliGRAM(s) Oral at bedtime  multivitamin 1 Tablet(s) Oral daily  mupirocin 2% Ointment 1 Application(s) Both Nostrils two times a day  ondansetron Injectable 4 milliGRAM(s) IV Push three times a day  pantoprazole  Injectable 40 milliGRAM(s) IV Push daily  polyethylene glycol 3350 17 Gram(s) Oral daily  tamsulosin 0.4 milliGRAM(s) Oral at bedtime  thiamine IVPB 500 milliGRAM(s) IV Intermittent daily    MEDICATIONS  (PRN):  dextrose Oral Gel 15 Gram(s) Oral once PRN Blood Glucose LESS THAN 70 milliGRAM(s)/deciliter  LORazepam     Tablet 1 milliGRAM(s) Oral every 2 hours PRN CIWA-Ar score increase by 2 points and a total score of 7 or less  LORazepam     Tablet 1 milliGRAM(s) Oral every 1 hour PRN CIWA-Ar score 8 or greater      CAPILLARY BLOOD GLUCOSE      POCT Blood Glucose.: 95 mg/dL (24 Aug 2024 08:43)  POCT Blood Glucose.: 89 mg/dL (23 Aug 2024 21:27)  POCT Blood Glucose.: 103 mg/dL (23 Aug 2024 17:39)  POCT Blood Glucose.: 108 mg/dL (23 Aug 2024 12:18)    I&O's Summary      PHYSICAL EXAM:  Vital Signs Last 24 Hrs  T(C): 36.7 (24 Aug 2024 05:15), Max: 37 (23 Aug 2024 21:23)  T(F): 98 (24 Aug 2024 05:15), Max: 98.6 (23 Aug 2024 21:23)  HR: 82 (24 Aug 2024 05:15) (72 - 82)  BP: 124/66 (24 Aug 2024 05:15) (111/70 - 124/66)  BP(mean): --  RR: 18 (24 Aug 2024 05:15) (18 - 18)  SpO2: 98% (24 Aug 2024 05:15) (95% - 100%)    Parameters below as of 24 Aug 2024 05:15  Patient On (Oxygen Delivery Method): room air        PHYSICAL EXAM:  CONSTITUTIONAL: NAD   HEAD: Normocephalic; atraumatic  EYES: EOMI  NECK: Trachea midline  CV: Normal S1, S2; no audible murmurs  RESP: normal work of breathing; CTAB   ABD: soft, non-distended; non-tender to palpation, reducible umbilical hernia  MSK/EXT: no LE edema, no limited ROM  NEURO: Moves all extremities spontaneously with no focal deficits, speech is appropriate, no tremor in bilateral hands, no fasciculations  PSYCH: well kempt, calm, interactive          LABS:                        13.8   9.14  )-----------( 242      ( 24 Aug 2024 08:03 )             40.2     08-24    135  |  99  |  8   ----------------------------<  116<H>  4.0   |  21<L>  |  0.95    Ca    8.9      24 Aug 2024 08:03  Phos  2.5     08-24  Mg     1.80     08-24    TPro  6.7  /  Alb  3.5  /  TBili  4.0<H>  /  DBili  x   /  AST  88<H>  /  ALT  23  /  AlkPhos  156<H>  08-24    PT/INR - ( 24 Aug 2024 08:03 )   PT: 16.9 sec;   INR: 1.51 ratio         PTT - ( 22 Aug 2024 14:27 )  PTT:39.5 sec      Urinalysis Basic - ( 24 Aug 2024 08:03 )    Color: x / Appearance: x / SG: x / pH: x  Gluc: 116 mg/dL / Ketone: x  / Bili: x / Urobili: x   Blood: x / Protein: x / Nitrite: x   Leuk Esterase: x / RBC: x / WBC x   Sq Epi: x / Non Sq Epi: x / Bacteria: x        Culture - Urine (collected 23 Aug 2024 00:13)  Source: Clean Catch  Preliminary Report (24 Aug 2024 08:17):    >100,000 CFU/ml Escherichia coli    Urinalysis with Rflx Culture (collected 23 Aug 2024 00:13)        RADIOLOGY & ADDITIONAL TESTS:  Results Reviewed:   Imaging Personally Reviewed:  Electrocardiogram Personally Reviewed:    COORDINATION OF CARE:  Care Discussed with Consultants/Other Providers [Y/N]:  Prior or Outpatient Records Reviewed [Y/N]:

## 2024-08-24 NOTE — PROGRESS NOTE ADULT - PROBLEM SELECTOR PLAN 2
-Patients cirrhosis is likely secondary to Alchol use. Does not appear to be decompensated at this time  -MELD: 21, Child-Islas: C 11  -CTAP on 8/22 showed Hepatic steatosis; Innumerable hypoattenuating subcentimeter nodules throughout the liver parenchyma, suggestive of regenerative nodules.   - serum drug screen negative, EtOH negative    Plan  -hepatitis neg  -Currently AOX3 and no asterixis, will hold off starting lactulose

## 2024-08-24 NOTE — PROGRESS NOTE ADULT - ATTENDING COMMENTS
68 yo M w/ hx HTN, DM, hepatic steatosis ( recent admission for similar compliants at [facility] found to have elevated LFTs US reported as fatty liver left AMA) , alcohol use ( endorsed differing alcohol use; reported 2 beers/day but as per family has been found on the floor by family intoxicated) p/w lethargy and weakness. +N/V over past month non bloody with decreased PO intake. On day of admission presented to PCP with lethargy and confusion. + UA positive     # cirrhosis- CT C/A/P- with regenerative nodules ? early cirrhosis; no overt ascites; NH4 high end of normal; repeat NH3; monitor LFTs; f/u hepatology serology  # N/V- CT head w/o ICH; odontogenic sinusitis reports no oral pain sinus tenderness; poss alcoholic gastritis/UTI- PPI; still unable tolerate PO, vomiting after eating. monitor PO intake if no improvement EGD next week; f/u heaptology recs   # alcohol use- alcohol level <10; as per family has not drank in 22 days; MVI/folate; CIWA protocol currently approx 2; c/w PPI;   # UTI- UA+ denies any dsyuria; asymptomatic UTI in male; ceftriaxone; F/u Ucx   # BPH-  wife states chronically urinary frequency for years; CT A/P with enlarged prostate; start flomax;  # hyponatremia- urine lytes consistent with pre- renal state; start IVF   # elevated AG- increased BoH butyrate; likely due to starvation ketosis; lactate improved; AG resolved   # DM- A1c 7.7; non compliant due to N/V with home meds; ISS  # HTN- monitor off BP meds  # HLD - hold statin due to elevated LFTs

## 2024-08-25 LAB
-  AMOXICILLIN/CLAVULANIC ACID: SIGNIFICANT CHANGE UP
-  AMPICILLIN/SULBACTAM: SIGNIFICANT CHANGE UP
-  AMPICILLIN: SIGNIFICANT CHANGE UP
-  AZTREONAM: SIGNIFICANT CHANGE UP
-  CEFAZOLIN: SIGNIFICANT CHANGE UP
-  CEFEPIME: SIGNIFICANT CHANGE UP
-  CEFOXITIN: SIGNIFICANT CHANGE UP
-  CEFTRIAXONE: SIGNIFICANT CHANGE UP
-  CEFUROXIME: SIGNIFICANT CHANGE UP
-  CIPROFLOXACIN: SIGNIFICANT CHANGE UP
-  ERTAPENEM: SIGNIFICANT CHANGE UP
-  GENTAMICIN: SIGNIFICANT CHANGE UP
-  IMIPENEM: SIGNIFICANT CHANGE UP
-  LEVOFLOXACIN: SIGNIFICANT CHANGE UP
-  MEROPENEM: SIGNIFICANT CHANGE UP
-  NITROFURANTOIN: SIGNIFICANT CHANGE UP
-  PIPERACILLIN/TAZOBACTAM: SIGNIFICANT CHANGE UP
-  TOBRAMYCIN: SIGNIFICANT CHANGE UP
-  TRIMETHOPRIM/SULFAMETHOXAZOLE: SIGNIFICANT CHANGE UP
ALBUMIN SERPL ELPH-MCNC: 3 G/DL — LOW (ref 3.3–5)
ALP SERPL-CCNC: 150 U/L — HIGH (ref 40–120)
ALT FLD-CCNC: 22 U/L — SIGNIFICANT CHANGE UP (ref 4–41)
ANION GAP SERPL CALC-SCNC: 14 MMOL/L — SIGNIFICANT CHANGE UP (ref 7–14)
AST SERPL-CCNC: 96 U/L — HIGH (ref 4–40)
BASOPHILS # BLD AUTO: 0.05 K/UL — SIGNIFICANT CHANGE UP (ref 0–0.2)
BASOPHILS NFR BLD AUTO: 0.5 % — SIGNIFICANT CHANGE UP (ref 0–2)
BILIRUB SERPL-MCNC: 3.8 MG/DL — HIGH (ref 0.2–1.2)
BUN SERPL-MCNC: 6 MG/DL — LOW (ref 7–23)
CALCIUM SERPL-MCNC: 8.6 MG/DL — SIGNIFICANT CHANGE UP (ref 8.4–10.5)
CHLORIDE SERPL-SCNC: 101 MMOL/L — SIGNIFICANT CHANGE UP (ref 98–107)
CO2 SERPL-SCNC: 21 MMOL/L — LOW (ref 22–31)
CREAT SERPL-MCNC: 0.88 MG/DL — SIGNIFICANT CHANGE UP (ref 0.5–1.3)
CULTURE RESULTS: ABNORMAL
EGFR: 94 ML/MIN/1.73M2 — SIGNIFICANT CHANGE UP
EOSINOPHIL # BLD AUTO: 0.16 K/UL — SIGNIFICANT CHANGE UP (ref 0–0.5)
EOSINOPHIL NFR BLD AUTO: 1.6 % — SIGNIFICANT CHANGE UP (ref 0–6)
GLUCOSE SERPL-MCNC: 111 MG/DL — HIGH (ref 70–99)
HCT VFR BLD CALC: 38.2 % — LOW (ref 39–50)
HEV IGM SER QL: NEGATIVE — SIGNIFICANT CHANGE UP
HGB BLD-MCNC: 13.3 G/DL — SIGNIFICANT CHANGE UP (ref 13–17)
IANC: 7.11 K/UL — SIGNIFICANT CHANGE UP (ref 1.8–7.4)
IMM GRANULOCYTES NFR BLD AUTO: 0.6 % — SIGNIFICANT CHANGE UP (ref 0–0.9)
LYMPHOCYTES # BLD AUTO: 1.76 K/UL — SIGNIFICANT CHANGE UP (ref 1–3.3)
LYMPHOCYTES # BLD AUTO: 17.2 % — SIGNIFICANT CHANGE UP (ref 13–44)
MAGNESIUM SERPL-MCNC: 1.7 MG/DL — SIGNIFICANT CHANGE UP (ref 1.6–2.6)
MCHC RBC-ENTMCNC: 31.6 PG — SIGNIFICANT CHANGE UP (ref 27–34)
MCHC RBC-ENTMCNC: 34.8 GM/DL — SIGNIFICANT CHANGE UP (ref 32–36)
MCV RBC AUTO: 90.7 FL — SIGNIFICANT CHANGE UP (ref 80–100)
METHOD TYPE: SIGNIFICANT CHANGE UP
MITOCHONDRIA AB SER-ACNC: SIGNIFICANT CHANGE UP
MONOCYTES # BLD AUTO: 1.11 K/UL — HIGH (ref 0–0.9)
MONOCYTES NFR BLD AUTO: 10.8 % — SIGNIFICANT CHANGE UP (ref 2–14)
NEUTROPHILS # BLD AUTO: 7.11 K/UL — SIGNIFICANT CHANGE UP (ref 1.8–7.4)
NEUTROPHILS NFR BLD AUTO: 69.3 % — SIGNIFICANT CHANGE UP (ref 43–77)
NRBC # BLD: 0 /100 WBCS — SIGNIFICANT CHANGE UP (ref 0–0)
NRBC # FLD: 0 K/UL — SIGNIFICANT CHANGE UP (ref 0–0)
ORGANISM # SPEC MICROSCOPIC CNT: ABNORMAL
ORGANISM # SPEC MICROSCOPIC CNT: ABNORMAL
PETH 16:0/18:1: 93 NG/ML — HIGH
PETH 16:0/18:2: 22 NG/ML — HIGH
PETH COMMENTS: SIGNIFICANT CHANGE UP
PHOSPHATE SERPL-MCNC: 2.5 MG/DL — SIGNIFICANT CHANGE UP (ref 2.5–4.5)
PLATELET # BLD AUTO: 242 K/UL — SIGNIFICANT CHANGE UP (ref 150–400)
POTASSIUM SERPL-MCNC: 3.8 MMOL/L — SIGNIFICANT CHANGE UP (ref 3.5–5.3)
POTASSIUM SERPL-SCNC: 3.8 MMOL/L — SIGNIFICANT CHANGE UP (ref 3.5–5.3)
PROT SERPL-MCNC: 6.2 G/DL — SIGNIFICANT CHANGE UP (ref 6–8.3)
RBC # BLD: 4.21 M/UL — SIGNIFICANT CHANGE UP (ref 4.2–5.8)
RBC # FLD: 15.9 % — HIGH (ref 10.3–14.5)
SMOOTH MUSCLE AB SER-ACNC: SIGNIFICANT CHANGE UP
SODIUM SERPL-SCNC: 136 MMOL/L — SIGNIFICANT CHANGE UP (ref 135–145)
SPECIMEN SOURCE: SIGNIFICANT CHANGE UP
WBC # BLD: 10.25 K/UL — SIGNIFICANT CHANGE UP (ref 3.8–10.5)
WBC # FLD AUTO: 10.25 K/UL — SIGNIFICANT CHANGE UP (ref 3.8–10.5)

## 2024-08-25 PROCEDURE — 99232 SBSQ HOSP IP/OBS MODERATE 35: CPT | Mod: GC

## 2024-08-25 RX ORDER — PANTOPRAZOLE SODIUM 40 MG
40 TABLET, DELAYED RELEASE (ENTERIC COATED) ORAL
Refills: 0 | Status: DISCONTINUED | OUTPATIENT
Start: 2024-08-25 | End: 2024-08-26

## 2024-08-25 RX ADMIN — Medication 3 MILLIGRAM(S): at 21:11

## 2024-08-25 RX ADMIN — Medication 1 APPLICATION(S): at 17:08

## 2024-08-25 RX ADMIN — Medication 1 MILLIGRAM(S): at 14:23

## 2024-08-25 RX ADMIN — CHLORHEXIDINE GLUCONATE 1 APPLICATION(S): 40 SOLUTION TOPICAL at 14:24

## 2024-08-25 RX ADMIN — TAMSULOSIN HYDROCHLORIDE 0.4 MILLIGRAM(S): 0.4 CAPSULE ORAL at 21:10

## 2024-08-25 RX ADMIN — Medication 5000 UNIT(S): at 05:46

## 2024-08-25 RX ADMIN — Medication 1 APPLICATION(S): at 05:45

## 2024-08-25 RX ADMIN — Medication 40 MILLIGRAM(S): at 17:08

## 2024-08-25 RX ADMIN — ONDANSETRON 4 MILLIGRAM(S): 2 INJECTION, SOLUTION INTRAMUSCULAR; INTRAVENOUS at 14:24

## 2024-08-25 RX ADMIN — Medication 100 MILLIGRAM(S): at 10:08

## 2024-08-25 RX ADMIN — Medication 1 TABLET(S): at 14:24

## 2024-08-25 RX ADMIN — POLYETHYLENE GLYCOL 3350 17 GRAM(S): 17 POWDER, FOR SOLUTION ORAL at 14:24

## 2024-08-25 RX ADMIN — ONDANSETRON 4 MILLIGRAM(S): 2 INJECTION, SOLUTION INTRAMUSCULAR; INTRAVENOUS at 05:45

## 2024-08-25 RX ADMIN — Medication 5000 UNIT(S): at 21:11

## 2024-08-25 RX ADMIN — Medication 5000 UNIT(S): at 14:24

## 2024-08-25 RX ADMIN — Medication 105 MILLIGRAM(S): at 14:23

## 2024-08-25 NOTE — PROGRESS NOTE ADULT - PROBLEM SELECTOR PLAN 4
-Patient admits to alcohol use and family also report that he has consumed alcohol heavily in the past  -Family state he has not been seen drinking in 2 weeks     Plan  -Continue with Thiamine and Folate

## 2024-08-25 NOTE — PROGRESS NOTE ADULT - ATTENDING COMMENTS
68 yo M w/ hx HTN, DM, hepatic steatosis ( recent admission for similar compliants at [facility] found to have elevated LFTs US reported as fatty liver left AMA) , alcohol use ( endorsed differing alcohol use; reported 2 beers/day but as per family has been found on the floor by family intoxicated) p/w lethargy and weakness. +N/V over past month non bloody with decreased PO intake. On day of admission presented to PCP with lethargy and confusion. + UA positive     # cirrhosis- CT C/A/P- with regenerative nodules ? early cirrhosis; no overt ascites; NH4 high end of normal; repeat NH3; monitor LFTs; f/u hepatology serology  # N/V- CT head w/o ICH; odontogenic sinusitis reports no oral pain sinus tenderness; poss alcoholic gastritis/UTI- PPI; still unable tolerate solids. vomiting after eating. GI plan for EGD monday.   # alcohol use- alcohol level <10; as per family has not drank in 22 days; MVI/folate; CIWA protocol currently approx 2; c/w PPI;   # UTI- UA+ denies any dsyuria; asymptomatic UTI in male; ceftriaxone; F/u Ucx   # BPH-  wife states chronically urinary frequency for years; CT A/P with enlarged prostate; start flomax;  # hyponatremia- urine lytes consistent with pre- renal state; start IVF   # elevated AG- increased BoH butyrate; likely due to starvation ketosis; lactate improved; AG resolved   # DM- A1c 7.7; non compliant due to N/V with home meds; ISS  # HTN- monitor off BP meds  # HLD - hold statin due to elevated LFTs

## 2024-08-25 NOTE — PROGRESS NOTE ADULT - ASSESSMENT
Patient is a 68 y/o male with pmh of HTN, DM, HLD and recently diagnosed hepatic steatosis presenting to the hospital due to abnormal liver enzymes. 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 Pending further workup with Hepatology.

## 2024-08-25 NOTE — PROGRESS NOTE ADULT - PROBLEM SELECTOR PLAN 1
CT A/P w/o evidence of structural abnormalities or gastric wall thickening. No hx of dysphagia or odynophagia. Would recommend PO challenge as tolerated, if unable to tolerate regular diet, can attempt liquid diet. If no significant improvement throughout weekend, may have to consider endoscopic evaluation.   - continues to vomit after solid feeds  - will consider EGD per GI if lack improvement CT A/P w/o evidence of structural abnormalities or gastric wall thickening. No hx of dysphagia or odynophagia. Would recommend PO challenge as tolerated, if unable to tolerate regular diet, can attempt liquid diet. If no significant improvement throughout weekend, may have to consider endoscopic evaluation.   - continues to vomit after solid feeds    Plan:  - follow-up with GI re: EGD

## 2024-08-25 NOTE — PROGRESS NOTE ADULT - PROBLEM SELECTOR PLAN 2
-Patients cirrhosis is likely secondary to Alchol use. Does not appear to be decompensated at this time  -MELD: 21, Child-Islas: C 11  -CTAP on 8/22 showed Hepatic steatosis; Innumerable hypoattenuating subcentimeter nodules throughout the liver parenchyma, suggestive of regenerative nodules.   - serum drug screen negative, EtOH negative  -hepatitis neg    Plan  -Currently AOX3 and no asterixis, will hold off starting lactulose

## 2024-08-25 NOTE — PROGRESS NOTE ADULT - PROBLEM SELECTOR PLAN 3
+ UA with chronic urinary frequency  CT A/P with enlarged prostate  f/u UCx     Plan:  - ceftriaxone (8/23-8/26)

## 2024-08-25 NOTE — PROGRESS NOTE ADULT - SUBJECTIVE AND OBJECTIVE BOX
Loida Simmons MD  EM/IM PGY-1  TEAMS  -----------------------------------------    ================== UNFINISHED NOTE =======================    INTERVAL HPI/OVERNIGHT EVENTS:    SUBJECTIVE: Patient seen and examined at bedside.     VITAL SIGNS:  T(C): 37.2 (08-25-24 @ 05:12), Max: 37.3 (08-24-24 @ 21:21)  HR: 85 (08-25-24 @ 05:12) (78 - 85)  BP: 118/67 (08-25-24 @ 05:12) (109/68 - 119/62)  RR: 18 (08-25-24 @ 05:12) (18 - 18)  SpO2: 98% (08-25-24 @ 05:12) (95% - 98%)    PHYSICAL EXAM:        MEDICATIONS:  MEDICATIONS  (STANDING):  cefTRIAXone   IVPB 1000 milliGRAM(s) IV Intermittent every 24 hours  chlorhexidine 2% Cloths 1 Application(s) Topical daily  dextrose 5%. 1000 milliLiter(s) (100 mL/Hr) IV Continuous <Continuous>  dextrose 5%. 1000 milliLiter(s) (50 mL/Hr) IV Continuous <Continuous>  dextrose 50% Injectable 12.5 Gram(s) IV Push once  dextrose 50% Injectable 25 Gram(s) IV Push once  dextrose 50% Injectable 25 Gram(s) IV Push once  folic acid Injectable 1 milliGRAM(s) IV Push daily  glucagon  Injectable 1 milliGRAM(s) IntraMuscular once  heparin   Injectable 5000 Unit(s) SubCutaneous every 8 hours  insulin lispro (ADMELOG) corrective regimen sliding scale   SubCutaneous at bedtime  insulin lispro (ADMELOG) corrective regimen sliding scale   SubCutaneous three times a day before meals  lactated ringers. 1000 milliLiter(s) (100 mL/Hr) IV Continuous <Continuous>  melatonin 3 milliGRAM(s) Oral at bedtime  multivitamin 1 Tablet(s) Oral daily  mupirocin 2% Ointment 1 Application(s) Both Nostrils two times a day  ondansetron Injectable 4 milliGRAM(s) IV Push three times a day  pantoprazole  Injectable 40 milliGRAM(s) IV Push daily  polyethylene glycol 3350 17 Gram(s) Oral daily  tamsulosin 0.4 milliGRAM(s) Oral at bedtime  thiamine IVPB 500 milliGRAM(s) IV Intermittent daily    MEDICATIONS  (PRN):  dextrose Oral Gel 15 Gram(s) Oral once PRN Blood Glucose LESS THAN 70 milliGRAM(s)/deciliter  LORazepam     Tablet 1 milliGRAM(s) Oral every 1 hour PRN CIWA-Ar score 8 or greater  LORazepam     Tablet 1 milliGRAM(s) Oral every 2 hours PRN CIWA-Ar score increase by 2 points and a total score of 7 or less      LABS:                        13.3   10.25 )-----------( 242      ( 25 Aug 2024 07:12 )             38.2     08-24    135  |  99  |  8   ----------------------------<  116<H>  4.0   |  21<L>  |  0.95    Ca    8.9      24 Aug 2024 08:03  Phos  2.5     08-24  Mg     1.80     08-24    TPro  6.7  /  Alb  3.5  /  TBili  4.0<H>  /  DBili  x   /  AST  88<H>  /  ALT  23  /  AlkPhos  156<H>  08-24    PT/INR - ( 24 Aug 2024 08:03 )   PT: 16.9 sec;   INR: 1.51 ratio           Urinalysis Basic - ( 24 Aug 2024 08:03 )    Color: x / Appearance: x / SG: x / pH: x  Gluc: 116 mg/dL / Ketone: x  / Bili: x / Urobili: x   Blood: x / Protein: x / Nitrite: x   Leuk Esterase: x / RBC: x / WBC x   Sq Epi: x / Non Sq Epi: x / Bacteria: x         Loida Simmons MD  EM/IM PGY-1  TEAMS  -----------------------------------------    INTERVAL HPI/OVERNIGHT EVENTS: No acute overnight events. Patient still unable to tolerate food.    SUBJECTIVE: Patient seen and examined at bedside.  ID: [id number]. Patient denies any nausea, headache, chest pain, or abdominal pain. He states he still is unable to eat food and yesterday when he tried he vomited right after.    VITAL SIGNS:  T(C): 37.2 (08-25-24 @ 05:12), Max: 37.3 (08-24-24 @ 21:21)  HR: 85 (08-25-24 @ 05:12) (78 - 85)  BP: 118/67 (08-25-24 @ 05:12) (109/68 - 119/62)  RR: 18 (08-25-24 @ 05:12) (18 - 18)  SpO2: 98% (08-25-24 @ 05:12) (95% - 98%)    PHYSICAL EXAM:  CONSTITUTIONAL: NAD   HEAD: Normocephalic; atraumatic  EYES: EOMI  NECK: Trachea midline  CV: Normal S1, S2; no audible murmurs  RESP: normal work of breathing; CTAB   ABD: soft, non-distended; non-tender to palpation, reducible umbilical hernia  MSK/EXT: no LE edema, no limited ROM  NEURO: Moves all extremities spontaneously with no focal deficits, speech is appropriate  PSYCH: well kempt, calm, interactive      MEDICATIONS:  MEDICATIONS  (STANDING):  cefTRIAXone   IVPB 1000 milliGRAM(s) IV Intermittent every 24 hours  chlorhexidine 2% Cloths 1 Application(s) Topical daily  dextrose 5%. 1000 milliLiter(s) (100 mL/Hr) IV Continuous <Continuous>  dextrose 5%. 1000 milliLiter(s) (50 mL/Hr) IV Continuous <Continuous>  dextrose 50% Injectable 12.5 Gram(s) IV Push once  dextrose 50% Injectable 25 Gram(s) IV Push once  dextrose 50% Injectable 25 Gram(s) IV Push once  folic acid Injectable 1 milliGRAM(s) IV Push daily  glucagon  Injectable 1 milliGRAM(s) IntraMuscular once  heparin   Injectable 5000 Unit(s) SubCutaneous every 8 hours  insulin lispro (ADMELOG) corrective regimen sliding scale   SubCutaneous at bedtime  insulin lispro (ADMELOG) corrective regimen sliding scale   SubCutaneous three times a day before meals  lactated ringers. 1000 milliLiter(s) (100 mL/Hr) IV Continuous <Continuous>  melatonin 3 milliGRAM(s) Oral at bedtime  multivitamin 1 Tablet(s) Oral daily  mupirocin 2% Ointment 1 Application(s) Both Nostrils two times a day  ondansetron Injectable 4 milliGRAM(s) IV Push three times a day  pantoprazole  Injectable 40 milliGRAM(s) IV Push daily  polyethylene glycol 3350 17 Gram(s) Oral daily  tamsulosin 0.4 milliGRAM(s) Oral at bedtime  thiamine IVPB 500 milliGRAM(s) IV Intermittent daily    MEDICATIONS  (PRN):  dextrose Oral Gel 15 Gram(s) Oral once PRN Blood Glucose LESS THAN 70 milliGRAM(s)/deciliter  LORazepam     Tablet 1 milliGRAM(s) Oral every 1 hour PRN CIWA-Ar score 8 or greater  LORazepam     Tablet 1 milliGRAM(s) Oral every 2 hours PRN CIWA-Ar score increase by 2 points and a total score of 7 or less      LABS:                        13.3   10.25 )-----------( 242      ( 25 Aug 2024 07:12 )             38.2     08-24    135  |  99  |  8   ----------------------------<  116<H>  4.0   |  21<L>  |  0.95    Ca    8.9      24 Aug 2024 08:03  Phos  2.5     08-24  Mg     1.80     08-24    TPro  6.7  /  Alb  3.5  /  TBili  4.0<H>  /  DBili  x   /  AST  88<H>  /  ALT  23  /  AlkPhos  156<H>  08-24    PT/INR - ( 24 Aug 2024 08:03 )   PT: 16.9 sec;   INR: 1.51 ratio           Urinalysis Basic - ( 24 Aug 2024 08:03 )    Color: x / Appearance: x / SG: x / pH: x  Gluc: 116 mg/dL / Ketone: x  / Bili: x / Urobili: x   Blood: x / Protein: x / Nitrite: x   Leuk Esterase: x / RBC: x / WBC x   Sq Epi: x / Non Sq Epi: x / Bacteria: x

## 2024-08-26 DIAGNOSIS — K76.0 FATTY (CHANGE OF) LIVER, NOT ELSEWHERE CLASSIFIED: ICD-10-CM

## 2024-08-26 LAB
ALBUMIN SERPL ELPH-MCNC: 3 G/DL — LOW (ref 3.3–5)
ALP SERPL-CCNC: 153 U/L — HIGH (ref 40–120)
ALT FLD-CCNC: 22 U/L — SIGNIFICANT CHANGE UP (ref 4–41)
ANA TITR SER: NEGATIVE — SIGNIFICANT CHANGE UP
ANION GAP SERPL CALC-SCNC: 13 MMOL/L — SIGNIFICANT CHANGE UP (ref 7–14)
AST SERPL-CCNC: 102 U/L — HIGH (ref 4–40)
BASOPHILS # BLD AUTO: 0.06 K/UL — SIGNIFICANT CHANGE UP (ref 0–0.2)
BASOPHILS NFR BLD AUTO: 0.6 % — SIGNIFICANT CHANGE UP (ref 0–2)
BILIRUB SERPL-MCNC: 4 MG/DL — HIGH (ref 0.2–1.2)
BLD GP AB SCN SERPL QL: NEGATIVE — SIGNIFICANT CHANGE UP
BUN SERPL-MCNC: 6 MG/DL — LOW (ref 7–23)
CALCIUM SERPL-MCNC: 8.7 MG/DL — SIGNIFICANT CHANGE UP (ref 8.4–10.5)
CHLORIDE SERPL-SCNC: 100 MMOL/L — SIGNIFICANT CHANGE UP (ref 98–107)
CO2 SERPL-SCNC: 20 MMOL/L — LOW (ref 22–31)
CREAT SERPL-MCNC: 0.91 MG/DL — SIGNIFICANT CHANGE UP (ref 0.5–1.3)
EGFR: 92 ML/MIN/1.73M2 — SIGNIFICANT CHANGE UP
EOSINOPHIL # BLD AUTO: 0.19 K/UL — SIGNIFICANT CHANGE UP (ref 0–0.5)
EOSINOPHIL NFR BLD AUTO: 1.8 % — SIGNIFICANT CHANGE UP (ref 0–6)
GLUCOSE SERPL-MCNC: 110 MG/DL — HIGH (ref 70–99)
HCT VFR BLD CALC: 38.6 % — LOW (ref 39–50)
HGB BLD-MCNC: 13.6 G/DL — SIGNIFICANT CHANGE UP (ref 13–17)
IANC: 7.44 K/UL — HIGH (ref 1.8–7.4)
IMM GRANULOCYTES NFR BLD AUTO: 0.6 % — SIGNIFICANT CHANGE UP (ref 0–0.9)
INR BLD: 1.64 RATIO — HIGH (ref 0.85–1.18)
LKM AB SER-ACNC: <20.1 UNITS — SIGNIFICANT CHANGE UP (ref 0–20)
LYMPHOCYTES # BLD AUTO: 1.66 K/UL — SIGNIFICANT CHANGE UP (ref 1–3.3)
LYMPHOCYTES # BLD AUTO: 16 % — SIGNIFICANT CHANGE UP (ref 13–44)
MCHC RBC-ENTMCNC: 31.3 PG — SIGNIFICANT CHANGE UP (ref 27–34)
MCHC RBC-ENTMCNC: 35.2 GM/DL — SIGNIFICANT CHANGE UP (ref 32–36)
MCV RBC AUTO: 88.7 FL — SIGNIFICANT CHANGE UP (ref 80–100)
MONOCYTES # BLD AUTO: 0.98 K/UL — HIGH (ref 0–0.9)
MONOCYTES NFR BLD AUTO: 9.4 % — SIGNIFICANT CHANGE UP (ref 2–14)
NEUTROPHILS # BLD AUTO: 7.44 K/UL — HIGH (ref 1.8–7.4)
NEUTROPHILS NFR BLD AUTO: 71.6 % — SIGNIFICANT CHANGE UP (ref 43–77)
NRBC # BLD: 0 /100 WBCS — SIGNIFICANT CHANGE UP (ref 0–0)
NRBC # FLD: 0 K/UL — SIGNIFICANT CHANGE UP (ref 0–0)
PLATELET # BLD AUTO: 245 K/UL — SIGNIFICANT CHANGE UP (ref 150–400)
POTASSIUM SERPL-MCNC: 3.9 MMOL/L — SIGNIFICANT CHANGE UP (ref 3.5–5.3)
POTASSIUM SERPL-SCNC: 3.9 MMOL/L — SIGNIFICANT CHANGE UP (ref 3.5–5.3)
PROT SERPL-MCNC: 6.3 G/DL — SIGNIFICANT CHANGE UP (ref 6–8.3)
PROTHROM AB SERPL-ACNC: 18.3 SEC — HIGH (ref 9.5–13)
RBC # BLD: 4.35 M/UL — SIGNIFICANT CHANGE UP (ref 4.2–5.8)
RBC # FLD: 15.9 % — HIGH (ref 10.3–14.5)
RH IG SCN BLD-IMP: POSITIVE — SIGNIFICANT CHANGE UP
SODIUM SERPL-SCNC: 133 MMOL/L — LOW (ref 135–145)
WBC # BLD: 10.39 K/UL — SIGNIFICANT CHANGE UP (ref 3.8–10.5)
WBC # FLD AUTO: 10.39 K/UL — SIGNIFICANT CHANGE UP (ref 3.8–10.5)

## 2024-08-26 PROCEDURE — 93010 ELECTROCARDIOGRAM REPORT: CPT

## 2024-08-26 PROCEDURE — 99232 SBSQ HOSP IP/OBS MODERATE 35: CPT | Mod: GC

## 2024-08-26 PROCEDURE — 88305 TISSUE EXAM BY PATHOLOGIST: CPT | Mod: 26

## 2024-08-26 PROCEDURE — 43235 EGD DIAGNOSTIC BRUSH WASH: CPT

## 2024-08-26 PROCEDURE — 88342 IMHCHEM/IMCYTCHM 1ST ANTB: CPT | Mod: 26

## 2024-08-26 PROCEDURE — 74220 X-RAY XM ESOPHAGUS 1CNTRST: CPT | Mod: 26

## 2024-08-26 RX ORDER — SODIUM CHLORIDE 9 MG/ML
500 INJECTION INTRAMUSCULAR; INTRAVENOUS; SUBCUTANEOUS ONCE
Refills: 0 | Status: COMPLETED | OUTPATIENT
Start: 2024-08-26 | End: 2024-08-26

## 2024-08-26 RX ORDER — PANTOPRAZOLE SODIUM 40 MG
40 TABLET, DELAYED RELEASE (ENTERIC COATED) ORAL
Refills: 0 | Status: DISCONTINUED | OUTPATIENT
Start: 2024-08-26 | End: 2024-08-28

## 2024-08-26 RX ADMIN — Medication 1 APPLICATION(S): at 05:36

## 2024-08-26 RX ADMIN — Medication 100 MILLILITER(S): at 08:11

## 2024-08-26 RX ADMIN — SODIUM CHLORIDE 1000 MILLILITER(S): 9 INJECTION INTRAMUSCULAR; INTRAVENOUS; SUBCUTANEOUS at 06:24

## 2024-08-26 RX ADMIN — CHLORHEXIDINE GLUCONATE 1 APPLICATION(S): 40 SOLUTION TOPICAL at 15:31

## 2024-08-26 RX ADMIN — Medication 105 MILLIGRAM(S): at 12:42

## 2024-08-26 RX ADMIN — Medication 5000 UNIT(S): at 05:35

## 2024-08-26 RX ADMIN — Medication 5000 UNIT(S): at 21:37

## 2024-08-26 RX ADMIN — Medication 3 MILLIGRAM(S): at 21:38

## 2024-08-26 RX ADMIN — TAMSULOSIN HYDROCHLORIDE 0.4 MILLIGRAM(S): 0.4 CAPSULE ORAL at 21:37

## 2024-08-26 RX ADMIN — Medication 5000 UNIT(S): at 15:30

## 2024-08-26 RX ADMIN — Medication 1 APPLICATION(S): at 18:45

## 2024-08-26 RX ADMIN — Medication 1 MILLIGRAM(S): at 15:27

## 2024-08-26 RX ADMIN — Medication 40 MILLIGRAM(S): at 05:35

## 2024-08-26 RX ADMIN — Medication 40 MILLIGRAM(S): at 18:49

## 2024-08-26 NOTE — PROGRESS NOTE ADULT - SUBJECTIVE AND OBJECTIVE BOX
Jessa Alvarez PGY1  Can be reached on Teams    Patient is a 69y old  Male who presents with a chief complaint of Elevated LFTS (25 Aug 2024 08:21)      SUBJECTIVE / OVERNIGHT EVENTS:  No acute events overnight. Patient in endoscopy for EGD in the morning.      MEDICATIONS  (STANDING):  chlorhexidine 2% Cloths 1 Application(s) Topical daily  dextrose 5%. 1000 milliLiter(s) (50 mL/Hr) IV Continuous <Continuous>  dextrose 5%. 1000 milliLiter(s) (100 mL/Hr) IV Continuous <Continuous>  dextrose 50% Injectable 25 Gram(s) IV Push once  dextrose 50% Injectable 25 Gram(s) IV Push once  dextrose 50% Injectable 12.5 Gram(s) IV Push once  folic acid Injectable 1 milliGRAM(s) IV Push daily  glucagon  Injectable 1 milliGRAM(s) IntraMuscular once  heparin   Injectable 5000 Unit(s) SubCutaneous every 8 hours  insulin lispro (ADMELOG) corrective regimen sliding scale   SubCutaneous every 6 hours  lactated ringers. 1000 milliLiter(s) (100 mL/Hr) IV Continuous <Continuous>  melatonin 3 milliGRAM(s) Oral at bedtime  multivitamin 1 Tablet(s) Oral daily  mupirocin 2% Ointment 1 Application(s) Both Nostrils two times a day  pantoprazole  Injectable 40 milliGRAM(s) IV Push two times a day  polyethylene glycol 3350 17 Gram(s) Oral daily  tamsulosin 0.4 milliGRAM(s) Oral at bedtime  thiamine IVPB 500 milliGRAM(s) IV Intermittent daily    MEDICATIONS  (PRN):  dextrose Oral Gel 15 Gram(s) Oral once PRN Blood Glucose LESS THAN 70 milliGRAM(s)/deciliter      CAPILLARY BLOOD GLUCOSE      POCT Blood Glucose.: 96 mg/dL (26 Aug 2024 10:52)  POCT Blood Glucose.: 95 mg/dL (26 Aug 2024 09:46)  POCT Blood Glucose.: 88 mg/dL (26 Aug 2024 09:02)  POCT Blood Glucose.: 101 mg/dL (26 Aug 2024 07:35)  POCT Blood Glucose.: 96 mg/dL (25 Aug 2024 21:07)  POCT Blood Glucose.: 101 mg/dL (25 Aug 2024 17:38)  POCT Blood Glucose.: 117 mg/dL (25 Aug 2024 12:19)    I&O's Summary      PHYSICAL EXAM:  Vital Signs Last 24 Hrs  T(C): 36.1 (26 Aug 2024 09:43), Max: 36.9 (25 Aug 2024 21:05)  T(F): 97 (26 Aug 2024 09:43), Max: 98.4 (25 Aug 2024 21:05)  HR: 77 (26 Aug 2024 10:15) (73 - 79)  BP: 109/65 (26 Aug 2024 10:15) (93/52 - 130/68)  BP(mean): --  RR: 20 (26 Aug 2024 10:15) (16 - 20)  SpO2: 99% (26 Aug 2024 10:15) (95% - 100%)    Parameters below as of 26 Aug 2024 10:15  Patient On (Oxygen Delivery Method): room air        PHYSICAL EXAM:  Did not obtain as patient was in endoscopy      LABS:                        13.6   10.39 )-----------( 245      ( 26 Aug 2024 04:00 )             38.6     08-26    133<L>  |  100  |  6<L>  ----------------------------<  110<H>  3.9   |  20<L>  |  0.91    Ca    8.7      26 Aug 2024 04:00  Phos  2.5     08-25  Mg     1.70     08-25    TPro  6.3  /  Alb  3.0<L>  /  TBili  4.0<H>  /  DBili  x   /  AST  102<H>  /  ALT  22  /  AlkPhos  153<H>  08-26    PT/INR - ( 26 Aug 2024 04:00 )   PT: 18.3 sec;   INR: 1.64 ratio               Urinalysis Basic - ( 26 Aug 2024 04:00 )    Color: x / Appearance: x / SG: x / pH: x  Gluc: 110 mg/dL / Ketone: x  / Bili: x / Urobili: x   Blood: x / Protein: x / Nitrite: x   Leuk Esterase: x / RBC: x / WBC x   Sq Epi: x / Non Sq Epi: x / Bacteria: x          RADIOLOGY & ADDITIONAL TESTS:  Results Reviewed: Y  Imaging Personally Reviewed: Y    COORDINATION OF CARE:  Care Discussed with Consultants/Other Providers [Y/N]: Y  Prior or Outpatient Records Reviewed [Y/N]: Y     Jessa Alvarez PGY1  Can be reached on Teams    Patient is a 69y old  Male who presents with a chief complaint of Elevated LFTS (25 Aug 2024 08:21)      SUBJECTIVE / OVERNIGHT EVENTS:  No acute events overnight. Patient in endoscopy for EGD in the morning.  No F/C, N/V, CP, SOB, Cough, lightheadedness, dizziness, abdominal pain, diarrhea, dysuria.      MEDICATIONS  (STANDING):  chlorhexidine 2% Cloths 1 Application(s) Topical daily  dextrose 5%. 1000 milliLiter(s) (50 mL/Hr) IV Continuous <Continuous>  dextrose 5%. 1000 milliLiter(s) (100 mL/Hr) IV Continuous <Continuous>  dextrose 50% Injectable 25 Gram(s) IV Push once  dextrose 50% Injectable 25 Gram(s) IV Push once  dextrose 50% Injectable 12.5 Gram(s) IV Push once  folic acid Injectable 1 milliGRAM(s) IV Push daily  glucagon  Injectable 1 milliGRAM(s) IntraMuscular once  heparin   Injectable 5000 Unit(s) SubCutaneous every 8 hours  insulin lispro (ADMELOG) corrective regimen sliding scale   SubCutaneous every 6 hours  lactated ringers. 1000 milliLiter(s) (100 mL/Hr) IV Continuous <Continuous>  melatonin 3 milliGRAM(s) Oral at bedtime  multivitamin 1 Tablet(s) Oral daily  mupirocin 2% Ointment 1 Application(s) Both Nostrils two times a day  pantoprazole  Injectable 40 milliGRAM(s) IV Push two times a day  polyethylene glycol 3350 17 Gram(s) Oral daily  tamsulosin 0.4 milliGRAM(s) Oral at bedtime  thiamine IVPB 500 milliGRAM(s) IV Intermittent daily    MEDICATIONS  (PRN):  dextrose Oral Gel 15 Gram(s) Oral once PRN Blood Glucose LESS THAN 70 milliGRAM(s)/deciliter      CAPILLARY BLOOD GLUCOSE      POCT Blood Glucose.: 96 mg/dL (26 Aug 2024 10:52)  POCT Blood Glucose.: 95 mg/dL (26 Aug 2024 09:46)  POCT Blood Glucose.: 88 mg/dL (26 Aug 2024 09:02)  POCT Blood Glucose.: 101 mg/dL (26 Aug 2024 07:35)  POCT Blood Glucose.: 96 mg/dL (25 Aug 2024 21:07)  POCT Blood Glucose.: 101 mg/dL (25 Aug 2024 17:38)  POCT Blood Glucose.: 117 mg/dL (25 Aug 2024 12:19)    I&O's Summary      PHYSICAL EXAM:  Vital Signs Last 24 Hrs  T(C): 36.1 (26 Aug 2024 09:43), Max: 36.9 (25 Aug 2024 21:05)  T(F): 97 (26 Aug 2024 09:43), Max: 98.4 (25 Aug 2024 21:05)  HR: 77 (26 Aug 2024 10:15) (73 - 79)  BP: 109/65 (26 Aug 2024 10:15) (93/52 - 130/68)  BP(mean): --  RR: 20 (26 Aug 2024 10:15) (16 - 20)  SpO2: 99% (26 Aug 2024 10:15) (95% - 100%)    Parameters below as of 26 Aug 2024 10:15  Patient On (Oxygen Delivery Method): room air        PHYSICAL EXAM:  Did not obtain as patient was in endoscopy      LABS:                        13.6   10.39 )-----------( 245      ( 26 Aug 2024 04:00 )             38.6     08-26    133<L>  |  100  |  6<L>  ----------------------------<  110<H>  3.9   |  20<L>  |  0.91    Ca    8.7      26 Aug 2024 04:00  Phos  2.5     08-25  Mg     1.70     08-25    TPro  6.3  /  Alb  3.0<L>  /  TBili  4.0<H>  /  DBili  x   /  AST  102<H>  /  ALT  22  /  AlkPhos  153<H>  08-26    PT/INR - ( 26 Aug 2024 04:00 )   PT: 18.3 sec;   INR: 1.64 ratio               Urinalysis Basic - ( 26 Aug 2024 04:00 )    Color: x / Appearance: x / SG: x / pH: x  Gluc: 110 mg/dL / Ketone: x  / Bili: x / Urobili: x   Blood: x / Protein: x / Nitrite: x   Leuk Esterase: x / RBC: x / WBC x   Sq Epi: x / Non Sq Epi: x / Bacteria: x          RADIOLOGY & ADDITIONAL TESTS:  Results Reviewed: Y  Imaging Personally Reviewed: Y    COORDINATION OF CARE:  Care Discussed with Consultants/Other Providers [Y/N]: Y  Prior or Outpatient Records Reviewed [Y/N]: Y

## 2024-08-26 NOTE — PROGRESS NOTE ADULT - PROBLEM SELECTOR PLAN 2
Likely secondary to Alchol use.  -CTAP on 8/22 showed Hepatic steatosis; Innumerable hypoattenuating subcentimeter nodules throughout the liver parenchyma, suggestive of regenerative nodules.   - serum drug screen negative, EtOH negative  -hepatitis neg    Plan  - F/u hepatology regarding further workup

## 2024-08-26 NOTE — PROGRESS NOTE ADULT - PROBLEM SELECTOR PLAN 4
-Patient admits to alcohol use and family also report that he has consumed alcohol heavily in the past  -Family state he has not been seen drinking in 2 weeks     Plan  -Continue with Thiamine and Folate.

## 2024-08-26 NOTE — PROGRESS NOTE ADULT - PROBLEM SELECTOR PLAN 5
-Takes Metformin and Jardiance at home  -A1C on 8/22 of 7.7    Plan  -Hold PO medications inpatient  -Low Dose ISS.

## 2024-08-26 NOTE — PROGRESS NOTE ADULT - PROBLEM SELECTOR PLAN 6
-Patients SBP at normal limits- will hold Amlodipine. Family reports he has not been taking it  -Will restart if BP elevates.

## 2024-08-26 NOTE — PROGRESS NOTE ADULT - PROBLEM SELECTOR PLAN 1
CT A/P w/o evidence of structural abnormalities or gastric wall thickening. No hx of dysphagia or odynophagia. Would recommend PO challenge as tolerated, if unable to tolerate regular diet, can attempt liquid diet. If no significant improvement throughout weekend, may have to consider endoscopic evaluation.   - continues to vomit after solid feeds    Plan:  - EGD results: Gastritis  - Plan for gastric motility study  - clear liquid diet and Reglan after motility study

## 2024-08-26 NOTE — PROGRESS NOTE ADULT - ATTENDING COMMENTS
69M HTN, DM2, p/w transaminitis with hyperbilirubinemia from EtOH abuse, c/b acute UTI, unable to tolerate PO intake d/t concern for gastroparesis.    Inability to tolerate PO  - concern for gastroparesis  - check gastric motility study  - clear liquid diet    Transaminitis w/ hyperbilirubinemia  - avoid hepatotoxic agents  - f/u hepatology recs    Acute UTI  - s/p 3 days of CTX IV

## 2024-08-26 NOTE — PROGRESS NOTE ADULT - PROBLEM SELECTOR PLAN 3
+ UA with chronic urinary frequency  CT A/P with enlarged prostate  f/u UCx     Plan:  - ceftriaxone (8/23-8/26).

## 2024-08-26 NOTE — CHART NOTE - NSCHARTNOTEFT_GEN_A_CORE
BRIEF HEPATOLOGY NOTE:    Preliminary results from EGD today 8/26:   - Mild gastritis in gastric antrum  - No varices  - No evidence of ulceration or gastric outlet obstruction to explain patient's persistent symptoms.  - Patient would benefit from gastric emptying study to r/o gastroparesis, however was informed by team that in patient NM gastric emptying studies only occur on Monday and Friday mornings. Given this, can trial advancing diet and trial of Reglan 5 mg Q8hrs PRN. Can adjust as tolerated for tx of nausea/vomiting w/ poor PO intake.     Brad Dupont   GI/Hep Fellow
Pt still not able to tolerate PO. Plan for EGD on Monday 8/26/2024    Recommendations:  -Ok for diet as tolerated today, please keep NPO after MN for EGD on Monday 8/26/2024  -IV PPI BID      Abdiaziz Beverly, PGY-6  Gastroenterology/Hepatology Fellow  Available on Microsoft Teams   [phone] (Long Range Pager)  [phone] (Short Range Pager [facility])    After 5pm, please contact the on-call GI fellow for any urgent issues via the Hospital Call .

## 2024-08-27 LAB
ALBUMIN SERPL ELPH-MCNC: 2.9 G/DL — LOW (ref 3.3–5)
ALP SERPL-CCNC: 148 U/L — HIGH (ref 40–120)
ALT FLD-CCNC: 21 U/L — SIGNIFICANT CHANGE UP (ref 4–41)
ANION GAP SERPL CALC-SCNC: 14 MMOL/L — SIGNIFICANT CHANGE UP (ref 7–14)
AST SERPL-CCNC: 96 U/L — HIGH (ref 4–40)
BILIRUB SERPL-MCNC: 3.8 MG/DL — HIGH (ref 0.2–1.2)
BUN SERPL-MCNC: 5 MG/DL — LOW (ref 7–23)
CALCIUM SERPL-MCNC: 8.5 MG/DL — SIGNIFICANT CHANGE UP (ref 8.4–10.5)
CHLORIDE SERPL-SCNC: 101 MMOL/L — SIGNIFICANT CHANGE UP (ref 98–107)
CO2 SERPL-SCNC: 21 MMOL/L — LOW (ref 22–31)
CREAT SERPL-MCNC: 0.91 MG/DL — SIGNIFICANT CHANGE UP (ref 0.5–1.3)
EGFR: 92 ML/MIN/1.73M2 — SIGNIFICANT CHANGE UP
GLUCOSE SERPL-MCNC: 85 MG/DL — SIGNIFICANT CHANGE UP (ref 70–99)
HCT VFR BLD CALC: 38.9 % — LOW (ref 39–50)
HEV AB FLD QL: POSITIVE
HGB BLD-MCNC: 13.6 G/DL — SIGNIFICANT CHANGE UP (ref 13–17)
MAGNESIUM SERPL-MCNC: 1.6 MG/DL — SIGNIFICANT CHANGE UP (ref 1.6–2.6)
MCHC RBC-ENTMCNC: 31.8 PG — SIGNIFICANT CHANGE UP (ref 27–34)
MCHC RBC-ENTMCNC: 35 GM/DL — SIGNIFICANT CHANGE UP (ref 32–36)
MCV RBC AUTO: 90.9 FL — SIGNIFICANT CHANGE UP (ref 80–100)
NRBC # BLD: 0 /100 WBCS — SIGNIFICANT CHANGE UP (ref 0–0)
NRBC # FLD: 0 K/UL — SIGNIFICANT CHANGE UP (ref 0–0)
PHOSPHATE SERPL-MCNC: 2.2 MG/DL — LOW (ref 2.5–4.5)
PLATELET # BLD AUTO: 263 K/UL — SIGNIFICANT CHANGE UP (ref 150–400)
POTASSIUM SERPL-MCNC: 3.7 MMOL/L — SIGNIFICANT CHANGE UP (ref 3.5–5.3)
POTASSIUM SERPL-SCNC: 3.7 MMOL/L — SIGNIFICANT CHANGE UP (ref 3.5–5.3)
PROT SERPL-MCNC: 6 G/DL — SIGNIFICANT CHANGE UP (ref 6–8.3)
RBC # BLD: 4.28 M/UL — SIGNIFICANT CHANGE UP (ref 4.2–5.8)
RBC # FLD: 16.2 % — HIGH (ref 10.3–14.5)
SODIUM SERPL-SCNC: 136 MMOL/L — SIGNIFICANT CHANGE UP (ref 135–145)
SOLUBLE LIVER IGG SER IA-ACNC: 1.3 — SIGNIFICANT CHANGE UP (ref 0–20)
WBC # BLD: 11.52 K/UL — HIGH (ref 3.8–10.5)
WBC # FLD AUTO: 11.52 K/UL — HIGH (ref 3.8–10.5)

## 2024-08-27 PROCEDURE — 99233 SBSQ HOSP IP/OBS HIGH 50: CPT | Mod: GC

## 2024-08-27 RX ORDER — POTASSIUM PHOSPHATE 236; 224 MG/ML; MG/ML
30 INJECTION, SOLUTION INTRAVENOUS ONCE
Refills: 0 | Status: COMPLETED | OUTPATIENT
Start: 2024-08-27 | End: 2024-08-27

## 2024-08-27 RX ORDER — POTASSIUM CHLORIDE 10 MEQ
40 TABLET, EXT RELEASE, PARTICLES/CRYSTALS ORAL ONCE
Refills: 0 | Status: COMPLETED | OUTPATIENT
Start: 2024-08-27 | End: 2024-08-27

## 2024-08-27 RX ADMIN — Medication 105 MILLIGRAM(S): at 13:41

## 2024-08-27 RX ADMIN — Medication 1 TABLET(S): at 13:42

## 2024-08-27 RX ADMIN — CHLORHEXIDINE GLUCONATE 1 APPLICATION(S): 40 SOLUTION TOPICAL at 13:41

## 2024-08-27 RX ADMIN — Medication 5000 UNIT(S): at 05:36

## 2024-08-27 RX ADMIN — Medication 1 MILLIGRAM(S): at 13:41

## 2024-08-27 RX ADMIN — Medication 1 APPLICATION(S): at 17:34

## 2024-08-27 RX ADMIN — Medication 1 APPLICATION(S): at 05:37

## 2024-08-27 RX ADMIN — Medication 3 MILLIGRAM(S): at 21:21

## 2024-08-27 RX ADMIN — Medication 40 MILLIGRAM(S): at 05:36

## 2024-08-27 RX ADMIN — Medication 25 GRAM(S): at 13:41

## 2024-08-27 RX ADMIN — Medication 40 MILLIEQUIVALENT(S): at 15:29

## 2024-08-27 RX ADMIN — Medication 40 MILLIGRAM(S): at 17:37

## 2024-08-27 RX ADMIN — POTASSIUM PHOSPHATE 83.33 MILLIMOLE(S): 236; 224 INJECTION, SOLUTION INTRAVENOUS at 15:30

## 2024-08-27 RX ADMIN — TAMSULOSIN HYDROCHLORIDE 0.4 MILLIGRAM(S): 0.4 CAPSULE ORAL at 21:22

## 2024-08-27 RX ADMIN — Medication 5000 UNIT(S): at 13:35

## 2024-08-27 RX ADMIN — Medication 5000 UNIT(S): at 21:21

## 2024-08-27 RX ADMIN — POLYETHYLENE GLYCOL 3350 17 GRAM(S): 17 POWDER, FOR SOLUTION ORAL at 13:42

## 2024-08-27 NOTE — PROGRESS NOTE ADULT - PROBLEM SELECTOR PLAN 1
CT A/P w/o evidence of structural abnormalities or gastric wall thickening. No hx of dysphagia or odynophagia.  - Vomits after solid feeds  Plan:  - EGD results: Gastritis at gastric antrum, no varices  - Consider gastric motility study  - Trial clear liquid diet, add Reglan if patient still not tolerating PO CT A/P w/o evidence of structural abnormalities or gastric wall thickening. No hx of dysphagia or odynophagia.  - Vomits after solid feeds  Plan:  - EGD results: Gastritis at gastric antrum, no varices  - Consider gastric motility study  - Tolerating CLD, advance to regular diet

## 2024-08-27 NOTE — PROGRESS NOTE ADULT - PROBLEM SELECTOR PLAN 3
+ UA with chronic urinary frequency  CT A/P with enlarged prostate  f/u UCx     Plan:  - s/p ceftriaxone (8/23-8/26).

## 2024-08-27 NOTE — PROGRESS NOTE ADULT - ASSESSMENT
Patient is a 68 y/o male with pmh of HTN, DM, HLD and recently diagnosed hepatic steatosis presenting to the hospital due to abnormal liver enzymes. 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 EGD was performed; no varices. Patient continuing to have poor PO tolerance. Patient is a 68 y/o male with pmh of HTN, DM, HLD and recently diagnosed hepatic steatosis presenting to the hospital due to abnormal liver enzymes. 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 EGD was performed; no varices. Originally with poor PO intake, now tolerating clear liquid diet.

## 2024-08-27 NOTE — PROGRESS NOTE ADULT - SUBJECTIVE AND OBJECTIVE BOX
69y Male s/p EGD  under GA    T(C): 37 (08-27-24 @ 06:00), Max: 37 (08-27-24 @ 06:00)  HR: 79 (08-27-24 @ 06:00) (73 - 82)  BP: 109/63 (08-27-24 @ 06:00) (93/52 - 130/68)  RR: 18 (08-27-24 @ 06:00) (18 - 20)  SpO2: 96% (08-27-24 @ 06:00) (95% - 100%)  Wt(kg): --    Pt doing well, no anesthesia complications or complaints noted or reported.   No Nausea  Pain well controlled

## 2024-08-27 NOTE — PROGRESS NOTE ADULT - PROBLEM SELECTOR PLAN 8
Diet: CLD, advance as tolerated  DVT ppx: Heparin 5k TID  Dispo: Pending course Diet: CLD, advance as tolerated  DVT ppx: Heparin 5k TID  Dispo: Home

## 2024-08-27 NOTE — PROGRESS NOTE ADULT - ASSESSMENT
TOVA DOBBS is a 69year old Male with history of HLD, HLD , DM2 who presents with worsening confusion and ascites w/ concern for acute decompensated cirrhosis.     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 Prior imaging w/ Hepatic steatosis. Suspect likely worsening MetALD cirrhosis given alcoholism initially thought to have acute decompensation however w/o ascites on imaging and no evidence of HE. CT A/P w/ hepatic steatosis w/ numerous subcentimeter nodules suggestive of regenerative nodules. Will likely need outpatient hepatology follow up.   Work up so far: Negative HAV IgM, HBsAg, HBsAb, HBcAb IgM, HCV Ab [with reflex HCV PCR if positive], HBV PCR, HEV IgM/IgG, SYEDA, ASMA, AMA, anti-LKM, normal IgG, ceruloplasmin   PETH: 93  MELD: 21  Child-Islas: C 11  HE: no evidence of HE. A&Ox3, completed serial 7s without difficulty.   Varices: No prior EGD. No hx of varices  Ascites: no evidence of ascites on imaging   HCC: will need MRI abdomen to better characterize numerous subcentimeter nodules (likely as outpatient)    #Abdominal Pain  #Nausea/Vomiting w/ Poor PO intake.   May be related to infection vs gastroparesis vs PUD vs GOO.  Unlikely to be 2/2 HE given absence of signs of HE at this time. Unclear at this time as patient has not wanted to attempt PO intake in the hospital. CT A/P w/o evidence of structural abnromalities or gastric wall thickening. No hx of dysphagia or odynophagia. S/p EGD w/ mild gastritis in antrum, no PUD or outlet obstruction to account for symptoms. Recommending PO trial as tolerated, and consider NM if continues to have difficulty w/ PO intake.     Recommendations:  - empiric high dose thiamine, folate, MVI  - If feasible, ideally NM gastric emptying study. However given that this test is only performed Monday and Friday mornings, can consider PO trial as tolerated and reglan trial if with nausea. Note, patient will be unable to undergo gastric emptying study if given reglan.  - If patient continues to tolerate PO intake w/ diet advancement, no contraindications from discharge from hepatology standpoint. Please provide patient w/ following info to make hepatology appointment: [phone] ([facility] [place])     Note incomplete until finalized by attending signature/attestation.    Brad Dupont  GI/Hepatology Fellow, PGY-4    MONDAY-FRIDAY 8AM-5PM:  Please message via [employer] or email [email or web address]     On Weekends/Holidays (All day) and Weekdays after 5 PM to 8 AM  For nonurgent consults please email:  Please email [email or web address]  For urgent consults:  Please contact on call GI team. See Amion schedule ([facility]), Ning paging system ([facility]), or call hospital  ([facility])       TOVA DOBBS is a 69year old Male with history of HLD, HLD , DM2 who presents with worsening confusion and ascites w/ concern for acute decompensated cirrhosis.     #Hepatic Steatosis   #Mild Alcoholic Hepatitis - DF 24  Pt presenting w/ 1 month hx of abdominal distension, pain, N/V and poor PO intake, sent by outpatient hepatologist 2/2 concern for worsening decompensated cirrhosis. CT A/P w/ hepatic steatosis w/ numerous subcentimeter nodules suggestive of regenerative nodules. Will likely need outpatient hepatology follow up. Patient likely w/ significant hepatic steatosis w/ mild alcoholic hepatitis, unclear if with cirrhosis at this time given normal platelet count. Currently patient otherwise compensated from hepatology standpoint.   Work up so far: Negative HAV IgM, HBsAg, HBsAb, HBcAb IgM, HCV Ab [with reflex HCV PCR if positive], HBV PCR, HEV IgM/IgG, SYEDA, ASMA, AMA, anti-LKM, normal IgG, ceruloplasmin   PETH: 93  MELD: 21  Child-Islas: C 11  HE: no evidence of HE. A&Ox3, completed serial 7s without difficulty.   Varices: No prior EGD. No hx of varices  Ascites: no evidence of ascites on imaging   HCC: will need MRI abdomen to better characterize numerous subcentimeter nodules (likely as outpatient)    #Abdominal Pain  #Nausea/Vomiting w/ Poor PO intake.   May be related to infection vs gastroparesis vs PUD vs GOO.  Unlikely to be 2/2 HE given absence of signs of HE at this time. Unclear at this time as patient has not wanted to attempt PO intake in the hospital. CT A/P w/o evidence of structural abnromalities or gastric wall thickening. No hx of dysphagia or odynophagia. S/p EGD w/ mild gastritis in antrum, no PUD or outlet obstruction to account for symptoms. Recommending PO trial as tolerated, and consider NM if continues to have difficulty w/ PO intake.     Recommendations:  - empiric high dose thiamine, folate, MVI  - If feasible, ideally NM gastric emptying study. However given that this test is only performed Monday and Friday mornings, can consider PO trial as tolerated and reglan trial if with nausea. Note, patient will be unable to undergo gastric emptying study if given reglan.  - If patient continues to tolerate PO intake w/ diet advancement, no contraindications from discharge from hepatology standpoint. Please provide patient w/ following info to make hepatology appointment: [phone] ([facility] [place])     Note incomplete until finalized by attending signature/attestation.    Brad Dupont  GI/Hepatology Fellow, PGY-4    MONDAY-FRIDAY 8AM-5PM:  Please message via [employer] or email [email or web address]     On Weekends/Holidays (All day) and Weekdays after 5 PM to 8 AM  For nonurgent consults please email:  Please email [email or web address]  For urgent consults:  Please contact on call GI team. See Amion schedule ([facility]), [facility] [facility] paging system ([facility]), or call hospital  ([facility])

## 2024-08-27 NOTE — PROGRESS NOTE ADULT - SUBJECTIVE AND OBJECTIVE BOX
Jessa Alvarez PGY1  Can be reached on Teams    Patient is a 69y old  Male who presents with a chief complaint of Elevated LFTS (26 Aug 2024 11:49)      SUBJECTIVE / OVERNIGHT EVENTS:    Patient seen and examined. No acute events overnight.    ADDITIONAL REVIEW OF SYSTEMS:  GENERAL: No fever, no chills  PULM: No shortness of breath  CARDIOVASCULAR: No chest pain, no palpitations  GI: No nausea, no vomiting, no abdominal pain, no diarrhea, no constipation  : No dysuria, no urinary frequency  MSK: No arthralgia, no myalgia  SKIN: No rashes, no lesions, no itching  NEURO: No weakness, no loss of sensation    MEDICATIONS  (STANDING):  chlorhexidine 2% Cloths 1 Application(s) Topical daily  dextrose 5%. 1000 milliLiter(s) (50 mL/Hr) IV Continuous <Continuous>  dextrose 5%. 1000 milliLiter(s) (100 mL/Hr) IV Continuous <Continuous>  dextrose 50% Injectable 12.5 Gram(s) IV Push once  dextrose 50% Injectable 25 Gram(s) IV Push once  dextrose 50% Injectable 25 Gram(s) IV Push once  folic acid Injectable 1 milliGRAM(s) IV Push daily  glucagon  Injectable 1 milliGRAM(s) IntraMuscular once  heparin   Injectable 5000 Unit(s) SubCutaneous every 8 hours  insulin lispro (ADMELOG) corrective regimen sliding scale   SubCutaneous three times a day before meals  insulin lispro (ADMELOG) corrective regimen sliding scale   SubCutaneous at bedtime  lactated ringers. 1000 milliLiter(s) (100 mL/Hr) IV Continuous <Continuous>  melatonin 3 milliGRAM(s) Oral at bedtime  multivitamin 1 Tablet(s) Oral daily  mupirocin 2% Ointment 1 Application(s) Both Nostrils two times a day  pantoprazole    Tablet 40 milliGRAM(s) Oral two times a day  polyethylene glycol 3350 17 Gram(s) Oral daily  tamsulosin 0.4 milliGRAM(s) Oral at bedtime  thiamine IVPB 500 milliGRAM(s) IV Intermittent daily    MEDICATIONS  (PRN):  dextrose Oral Gel 15 Gram(s) Oral once PRN Blood Glucose LESS THAN 70 milliGRAM(s)/deciliter      CAPILLARY BLOOD GLUCOSE      POCT Blood Glucose.: 98 mg/dL (26 Aug 2024 21:36)  POCT Blood Glucose.: 101 mg/dL (26 Aug 2024 17:35)  POCT Blood Glucose.: 98 mg/dL (26 Aug 2024 14:09)  POCT Blood Glucose.: 96 mg/dL (26 Aug 2024 10:52)  POCT Blood Glucose.: 95 mg/dL (26 Aug 2024 09:46)  POCT Blood Glucose.: 88 mg/dL (26 Aug 2024 09:02)  POCT Blood Glucose.: 101 mg/dL (26 Aug 2024 07:35)    I&O's Summary      PHYSICAL EXAM:  Vital Signs Last 24 Hrs  T(C): 37 (27 Aug 2024 06:00), Max: 37 (27 Aug 2024 06:00)  T(F): 98.6 (27 Aug 2024 06:00), Max: 98.6 (27 Aug 2024 06:00)  HR: 79 (27 Aug 2024 06:00) (73 - 82)  BP: 109/63 (27 Aug 2024 06:00) (93/52 - 130/68)  BP(mean): --  RR: 18 (27 Aug 2024 06:00) (18 - 20)  SpO2: 96% (27 Aug 2024 06:00) (95% - 100%)    Parameters below as of 27 Aug 2024 06:00  Patient On (Oxygen Delivery Method): room air        GENERAL: NAD, well-developed  HEENT: PERRLA, EOMI, no conjunctival injection, no scleral icterus, moist mucous membranes  RESPIRATORY: Normal respiratory effort; lungs are clear to auscultation bilaterally  CARDIOVASCULAR: Regular rate and rhythm, normal S1 and S2, no murmur/rub/gallop; No lower extremity edema; Peripheral pulses are 2+ bilaterally  GI: Nontender to palpation, normoactive bowel sounds, no rebound/guarding; No hepatosplenomegaly  MUSCULOSKELETAL: no clubbing or cyanosis of digits; no joint swelling or tenderness to palpation  NEURO: CN 2-12 grossly intact, moves all limbs spontaneously  PSYCH: A+O to person, place, and time; affect appropriate    LABS:                        13.6   10.39 )-----------( 245      ( 26 Aug 2024 04:00 )             38.6     08-26    133<L>  |  100  |  6<L>  ----------------------------<  110<H>  3.9   |  20<L>  |  0.91    Ca    8.7      26 Aug 2024 04:00    TPro  6.3  /  Alb  3.0<L>  /  TBili  4.0<H>  /  DBili  x   /  AST  102<H>  /  ALT  22  /  AlkPhos  153<H>  08-26    PT/INR - ( 26 Aug 2024 04:00 )   PT: 18.3 sec;   INR: 1.64 ratio               Urinalysis Basic - ( 26 Aug 2024 04:00 )    Color: x / Appearance: x / SG: x / pH: x  Gluc: 110 mg/dL / Ketone: x  / Bili: x / Urobili: x   Blood: x / Protein: x / Nitrite: x   Leuk Esterase: x / RBC: x / WBC x   Sq Epi: x / Non Sq Epi: x / Bacteria: x          RADIOLOGY & ADDITIONAL TESTS:  Results Reviewed: Y  Imaging Personally Reviewed: Y    COORDINATION OF CARE:  Care Discussed with Consultants/Other Providers [Y/N]: Y  Prior or Outpatient Records Reviewed [Y/N]: MARCEL     Jessa Alvarez PGY1  Can be reached on Teams    Patient is a 69y old  Male who presents with a chief complaint of Elevated LFTS (26 Aug 2024 11:49)      SUBJECTIVE / OVERNIGHT EVENTS:    Patient seen and examined. No acute events overnight. Patient feels well today. He drank some fluids and was able to keep it down without vomiting.    ADDITIONAL REVIEW OF SYSTEMS:  GENERAL: No fever, no chills  PULM: No shortness of breath  CARDIOVASCULAR: No chest pain  GI: No nausea, no vomiting, no abdominal pain, no diarrhea, no constipation  : No dysuria, no urinary frequency  MSK: No arthralgia, no myalgia  SKIN: No rashes, no lesions, no itching  NEURO: No weakness, no loss of sensation    MEDICATIONS  (STANDING):  chlorhexidine 2% Cloths 1 Application(s) Topical daily  dextrose 5%. 1000 milliLiter(s) (50 mL/Hr) IV Continuous <Continuous>  dextrose 5%. 1000 milliLiter(s) (100 mL/Hr) IV Continuous <Continuous>  dextrose 50% Injectable 12.5 Gram(s) IV Push once  dextrose 50% Injectable 25 Gram(s) IV Push once  dextrose 50% Injectable 25 Gram(s) IV Push once  folic acid Injectable 1 milliGRAM(s) IV Push daily  glucagon  Injectable 1 milliGRAM(s) IntraMuscular once  heparin   Injectable 5000 Unit(s) SubCutaneous every 8 hours  insulin lispro (ADMELOG) corrective regimen sliding scale   SubCutaneous three times a day before meals  insulin lispro (ADMELOG) corrective regimen sliding scale   SubCutaneous at bedtime  lactated ringers. 1000 milliLiter(s) (100 mL/Hr) IV Continuous <Continuous>  melatonin 3 milliGRAM(s) Oral at bedtime  multivitamin 1 Tablet(s) Oral daily  mupirocin 2% Ointment 1 Application(s) Both Nostrils two times a day  pantoprazole    Tablet 40 milliGRAM(s) Oral two times a day  polyethylene glycol 3350 17 Gram(s) Oral daily  tamsulosin 0.4 milliGRAM(s) Oral at bedtime  thiamine IVPB 500 milliGRAM(s) IV Intermittent daily    MEDICATIONS  (PRN):  dextrose Oral Gel 15 Gram(s) Oral once PRN Blood Glucose LESS THAN 70 milliGRAM(s)/deciliter      CAPILLARY BLOOD GLUCOSE      POCT Blood Glucose.: 98 mg/dL (26 Aug 2024 21:36)  POCT Blood Glucose.: 101 mg/dL (26 Aug 2024 17:35)  POCT Blood Glucose.: 98 mg/dL (26 Aug 2024 14:09)  POCT Blood Glucose.: 96 mg/dL (26 Aug 2024 10:52)  POCT Blood Glucose.: 95 mg/dL (26 Aug 2024 09:46)  POCT Blood Glucose.: 88 mg/dL (26 Aug 2024 09:02)  POCT Blood Glucose.: 101 mg/dL (26 Aug 2024 07:35)    I&O's Summary      PHYSICAL EXAM:  Vital Signs Last 24 Hrs  T(C): 37 (27 Aug 2024 06:00), Max: 37 (27 Aug 2024 06:00)  T(F): 98.6 (27 Aug 2024 06:00), Max: 98.6 (27 Aug 2024 06:00)  HR: 79 (27 Aug 2024 06:00) (73 - 82)  BP: 109/63 (27 Aug 2024 06:00) (93/52 - 130/68)  BP(mean): --  RR: 18 (27 Aug 2024 06:00) (18 - 20)  SpO2: 96% (27 Aug 2024 06:00) (95% - 100%)    Parameters below as of 27 Aug 2024 06:00  Patient On (Oxygen Delivery Method): room air        GENERAL: NAD  HEENT: EOMI, no conjunctival injection, no scleral icterus, moist mucous membranes  RESPIRATORY: Normal respiratory effort; lungs are clear to auscultation bilaterally  CARDIOVASCULAR: Regular rate and rhythm, normal S1 and S2, no murmur/rub/gallop; No lower extremity edema  GI: Nontender to palpation, normoactive bowel sounds, no rebound/guarding  MUSCULOSKELETAL: ROM grossly normal, no joint swelling or tenderness to palpation  NEURO: No focal deficits, moves all limbs spontaneously  PSYCH: A+O to person, place, and time; affect appropriate    LABS:                        13.6   10.39 )-----------( 245      ( 26 Aug 2024 04:00 )             38.6     08-26    133<L>  |  100  |  6<L>  ----------------------------<  110<H>  3.9   |  20<L>  |  0.91    Ca    8.7      26 Aug 2024 04:00    TPro  6.3  /  Alb  3.0<L>  /  TBili  4.0<H>  /  DBili  x   /  AST  102<H>  /  ALT  22  /  AlkPhos  153<H>  08-26    PT/INR - ( 26 Aug 2024 04:00 )   PT: 18.3 sec;   INR: 1.64 ratio               Urinalysis Basic - ( 26 Aug 2024 04:00 )    Color: x / Appearance: x / SG: x / pH: x  Gluc: 110 mg/dL / Ketone: x  / Bili: x / Urobili: x   Blood: x / Protein: x / Nitrite: x   Leuk Esterase: x / RBC: x / WBC x   Sq Epi: x / Non Sq Epi: x / Bacteria: x          RADIOLOGY & ADDITIONAL TESTS:  Results Reviewed: Y  Imaging Personally Reviewed: Y    COORDINATION OF CARE:  Care Discussed with Consultants/Other Providers [Y/N]: Y  Prior or Outpatient Records Reviewed [Y/N]: MARCEL

## 2024-08-27 NOTE — PROGRESS NOTE ADULT - PROBLEM SELECTOR PLAN 6
-Patient's SBP at normal limits- will hold Amlodipine. Family reports he has not been taking it  -Will restart if BP elevates.

## 2024-08-27 NOTE — PROGRESS NOTE ADULT - SUBJECTIVE AND OBJECTIVE BOX
Patient is a 69y old  Male who presents with a chief complaint of Elevated LFTS (27 Aug 2024 07:24)      Interval Events:   No acute events overnight.  Patient denies any acute symptoms at this time. The patient reports that he had spoonfuls of food yesterday without much issue. He stated no nausea, vomiting episodes. He reports encouraged to try again.     ROS:   A 12-point ROS was performed and negative except as noted in HPI.    Hospital Medications:  chlorhexidine 2% Cloths 1 Application(s) Topical daily  dextrose 5%. 1000 milliLiter(s) IV Continuous <Continuous>  dextrose 5%. 1000 milliLiter(s) IV Continuous <Continuous>  dextrose 50% Injectable 12.5 Gram(s) IV Push once  dextrose 50% Injectable 25 Gram(s) IV Push once  dextrose 50% Injectable 25 Gram(s) IV Push once  dextrose Oral Gel 15 Gram(s) Oral once PRN  folic acid Injectable 1 milliGRAM(s) IV Push daily  glucagon  Injectable 1 milliGRAM(s) IntraMuscular once  heparin   Injectable 5000 Unit(s) SubCutaneous every 8 hours  insulin lispro (ADMELOG) corrective regimen sliding scale   SubCutaneous at bedtime  insulin lispro (ADMELOG) corrective regimen sliding scale   SubCutaneous three times a day before meals  lactated ringers. 1000 milliLiter(s) IV Continuous <Continuous>  melatonin 3 milliGRAM(s) Oral at bedtime  multivitamin 1 Tablet(s) Oral daily  mupirocin 2% Ointment 1 Application(s) Both Nostrils two times a day  pantoprazole    Tablet 40 milliGRAM(s) Oral two times a day  polyethylene glycol 3350 17 Gram(s) Oral daily  tamsulosin 0.4 milliGRAM(s) Oral at bedtime  thiamine IVPB 500 milliGRAM(s) IV Intermittent daily      PHYSICAL EXAM:   Vital Signs:  Vital Signs Last 24 Hrs  T(C): 37 (27 Aug 2024 06:00), Max: 37 (27 Aug 2024 06:00)  T(F): 98.6 (27 Aug 2024 06:00), Max: 98.6 (27 Aug 2024 06:00)  HR: 79 (27 Aug 2024 06:00) (73 - 82)  BP: 109/63 (27 Aug 2024 06:00) (93/52 - 130/68)  BP(mean): --  RR: 18 (27 Aug 2024 06:00) (18 - 20)  SpO2: 96% (27 Aug 2024 06:00) (95% - 100%)    Parameters below as of 27 Aug 2024 06:00  Patient On (Oxygen Delivery Method): room air      Daily Height in cm: 172.7 (26 Aug 2024 08:03)    Daily     GENERAL: no acute distress  NEURO: alert  HEENT: anicteric sclera, no conjunctival pallor appreciated  CHEST: no respiratory distress, no accessory muscle use  CARDIAC: regular rate  ABDOMEN: soft, mildly distended abdomen, nontender   EXTREMITIES: warm, well perfused, no edema  SKIN: no lesions noted    LABS: reviewed                        13.6   11.52 )-----------( 263      ( 27 Aug 2024 06:00 )             38.9     08-26    133<L>  |  100  |  6<L>  ----------------------------<  110<H>  3.9   |  20<L>  |  0.91    Ca    8.7      26 Aug 2024 04:00    TPro  6.3  /  Alb  3.0<L>  /  TBili  4.0<H>  /  DBili  x   /  AST  102<H>  /  ALT  22  /  AlkPhos  153<H>  08-26    LIVER FUNCTIONS - ( 26 Aug 2024 04:00 )  Alb: 3.0 g/dL / Pro: 6.3 g/dL / ALK PHOS: 153 U/L / ALT: 22 U/L / AST: 102 U/L / GGT: x             Interval Diagnostic Studies: see sunrise for full report

## 2024-08-27 NOTE — PROGRESS NOTE ADULT - ATTENDING COMMENTS
69M HTN, DM2, p/w transaminitis with hyperbilirubinemia from EtOH abuse, c/b acute UTI, unable to tolerate PO intake d/t concern for gastroparesis.    Inability to tolerate PO  - concern for gastroparesis  - check gastric motility study  - advance to regular diet and monitor for toleration  - if able to tolerate, can d/c to home.    Transaminitis w/ hyperbilirubinemia  - avoid hepatotoxic agents  - f/u hepatology recs    Acute UTI  - s/p 3 days of CTX IV 69M HTN, DM2, p/w transaminitis with hyperbilirubinemia from EtOH abuse, c/b acute UTI, c/b acute metabolic encephalopathy, unable to tolerate PO intake.    Inability to tolerate PO  - concern for gastroparesis  - check gastric motility study  - advance to regular diet and monitor for toleration  - if able to tolerate, can d/c to home.    Transaminitis w/ hyperbilirubinemia  - avoid hepatotoxic agents  - f/u hepatology recs    Acute UTI  - s/p 3 days of CTX IV    Acute metabolic encephalopathy likely due to Acute UTI   - resolved

## 2024-08-28 PROCEDURE — 99233 SBSQ HOSP IP/OBS HIGH 50: CPT | Mod: GC

## 2024-08-28 RX ADMIN — Medication 1 TABLET(S): at 11:10

## 2024-08-28 RX ADMIN — Medication 40 MILLIGRAM(S): at 05:10

## 2024-08-28 RX ADMIN — CHLORHEXIDINE GLUCONATE 1 APPLICATION(S): 40 SOLUTION TOPICAL at 11:09

## 2024-08-28 RX ADMIN — POLYETHYLENE GLYCOL 3350 17 GRAM(S): 17 POWDER, FOR SOLUTION ORAL at 11:10

## 2024-08-28 RX ADMIN — Medication 1 APPLICATION(S): at 05:10

## 2024-08-28 RX ADMIN — Medication 5000 UNIT(S): at 21:36

## 2024-08-28 RX ADMIN — Medication 5000 UNIT(S): at 05:10

## 2024-08-28 RX ADMIN — Medication 5000 UNIT(S): at 14:35

## 2024-08-28 RX ADMIN — Medication 1 MILLIGRAM(S): at 11:09

## 2024-08-28 RX ADMIN — Medication 40 MILLIGRAM(S): at 17:20

## 2024-08-28 NOTE — PROGRESS NOTE ADULT - PROBLEM SELECTOR PLAN 1
CT A/P w/o evidence of structural abnormalities or gastric wall thickening. No hx of dysphagia or odynophagia.  - Vomits after solid feeds  Plan:  - EGD results: Gastritis at gastric antrum, no varices  - Consider gastric motility study on Friday 8/30  - Tolerating CLD, advance to full liquid diet. CT A/P w/o evidence of structural abnormalities or gastric wall thickening. No hx of dysphagia or odynophagia.  - Vomits after solid feeds  Plan:  - EGD results: Gastritis at gastric antrum, no varices  - Consider gastric motility study on Friday 8/30  - Advanced to full liquid diet but had emesis on 8/28 AM

## 2024-08-28 NOTE — PROGRESS NOTE ADULT - PROBLEM SELECTOR PLAN 2
Likely secondary to Alchol use.  -CTAP on 8/22 showed Hepatic steatosis; Innumerable hypoattenuating subcentimeter nodules throughout the liver parenchyma, suggestive of regenerative nodules.   - serum drug screen negative, EtOH negative  -hepatitis neg  -Unlikely to be cirrhosis, patient is compensated    Plan  - Safe for discharge from hepatology standpoint, will need outpatient follow-up

## 2024-08-28 NOTE — PROGRESS NOTE ADULT - ASSESSMENT
Patient is a 68 y/o male with pmh of HTN, DM, HLD and recently diagnosed hepatic steatosis presenting to the hospital due to abnormal liver enzymes. 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 EGD was performed; no varices. Originally with poor PO intake, now tolerating clear liquid diet and trialing full liquid. Patient is a 68 y/o male with pmh of HTN, DM, HLD and recently diagnosed hepatic steatosis presenting to the hospital due to abnormal liver enzymes. 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 EGD was performed; no varices. Continues to have poor PO tolerance.

## 2024-08-28 NOTE — PROGRESS NOTE ADULT - PROBLEM SELECTOR PLAN 3
+ UA with chronic urinary frequency  CT A/P with enlarged prostate  f/u UCx     Plan:  - s/p ceftriaxone (8/23-8/26)  - Resolved

## 2024-08-28 NOTE — PROGRESS NOTE ADULT - SUBJECTIVE AND OBJECTIVE BOX
Jessa Alvarez PGY1  Can be reached on Teams    Patient is a 69y old  Male who presents with a chief complaint of Elevated LFTS (27 Aug 2024 08:51)      SUBJECTIVE / OVERNIGHT EVENTS:    Patient seen and examined. No acute events overnight.    ADDITIONAL REVIEW OF SYSTEMS:  GENERAL: No fever, no chills  PULM: No shortness of breath  CARDIOVASCULAR: No chest pain, no palpitations  GI: No nausea, no vomiting, no abdominal pain, no diarrhea, no constipation  : No dysuria, no urinary frequency  MSK: No arthralgia, no myalgia  SKIN: No rashes, no lesions, no itching  NEURO: No weakness, no loss of sensation    MEDICATIONS  (STANDING):  chlorhexidine 2% Cloths 1 Application(s) Topical daily  dextrose 5%. 1000 milliLiter(s) (100 mL/Hr) IV Continuous <Continuous>  dextrose 5%. 1000 milliLiter(s) (50 mL/Hr) IV Continuous <Continuous>  dextrose 50% Injectable 25 Gram(s) IV Push once  dextrose 50% Injectable 12.5 Gram(s) IV Push once  dextrose 50% Injectable 25 Gram(s) IV Push once  folic acid Injectable 1 milliGRAM(s) IV Push daily  glucagon  Injectable 1 milliGRAM(s) IntraMuscular once  heparin   Injectable 5000 Unit(s) SubCutaneous every 8 hours  insulin lispro (ADMELOG) corrective regimen sliding scale   SubCutaneous three times a day before meals  insulin lispro (ADMELOG) corrective regimen sliding scale   SubCutaneous at bedtime  lactated ringers. 1000 milliLiter(s) (100 mL/Hr) IV Continuous <Continuous>  melatonin 3 milliGRAM(s) Oral at bedtime  multivitamin 1 Tablet(s) Oral daily  pantoprazole    Tablet 40 milliGRAM(s) Oral two times a day  polyethylene glycol 3350 17 Gram(s) Oral daily  tamsulosin 0.4 milliGRAM(s) Oral at bedtime    MEDICATIONS  (PRN):  dextrose Oral Gel 15 Gram(s) Oral once PRN Blood Glucose LESS THAN 70 milliGRAM(s)/deciliter      CAPILLARY BLOOD GLUCOSE      POCT Blood Glucose.: 113 mg/dL (27 Aug 2024 21:18)  POCT Blood Glucose.: 120 mg/dL (27 Aug 2024 17:55)  POCT Blood Glucose.: 119 mg/dL (27 Aug 2024 12:27)  POCT Blood Glucose.: 105 mg/dL (27 Aug 2024 08:39)    I&O's Summary      PHYSICAL EXAM:  Vital Signs Last 24 Hrs  T(C): 36.9 (28 Aug 2024 05:44), Max: 36.9 (28 Aug 2024 05:44)  T(F): 98.4 (28 Aug 2024 05:44), Max: 98.4 (28 Aug 2024 05:44)  HR: 79 (28 Aug 2024 05:44) (79 - 83)  BP: 100/55 (28 Aug 2024 05:44) (100/55 - 116/87)  BP(mean): --  RR: 17 (28 Aug 2024 05:44) (17 - 17)  SpO2: 98% (28 Aug 2024 05:44) (94% - 98%)    Parameters below as of 28 Aug 2024 05:44  Patient On (Oxygen Delivery Method): room air        GENERAL: NAD  HEENT: EOMI, no conjunctival injection, no scleral icterus, moist mucous membranes  RESPIRATORY: Normal respiratory effort; lungs are clear to auscultation bilaterally  CARDIOVASCULAR: Regular rate and rhythm, normal S1 and S2, no murmur/rub/gallop; No lower extremity edema; Peripheral pulses are 2+ bilaterally  GI: Nontender to palpation, normoactive bowel sounds, no rebound/guarding; No hepatosplenomegaly  MUSCULOSKELETAL: ROM grossly normal; no joint swelling or tenderness to palpation  NEURO: No focal deficits, moves all limbs spontaneously  PSYCH: A+O to person, place, and time; affect appropriate    LABS:                        13.6   11.52 )-----------( 263      ( 27 Aug 2024 06:00 )             38.9     08-27    136  |  101  |  5<L>  ----------------------------<  85  3.7   |  21<L>  |  0.91    Ca    8.5      27 Aug 2024 06:00  Phos  2.2     08-27  Mg     1.60     08-27    TPro  6.0  /  Alb  2.9<L>  /  TBili  3.8<H>  /  DBili  x   /  AST  96<H>  /  ALT  21  /  AlkPhos  148<H>  08-27          Urinalysis Basic - ( 27 Aug 2024 06:00 )    Color: x / Appearance: x / SG: x / pH: x  Gluc: 85 mg/dL / Ketone: x  / Bili: x / Urobili: x   Blood: x / Protein: x / Nitrite: x   Leuk Esterase: x / RBC: x / WBC x   Sq Epi: x / Non Sq Epi: x / Bacteria: x          RADIOLOGY & ADDITIONAL TESTS:  Results Reviewed: Y  Imaging Personally Reviewed: Y    COORDINATION OF CARE:  Care Discussed with Consultants/Other Providers [Y/N]: Y  Prior or Outpatient Records Reviewed [Y/N]: Y     Jessa Alvarez PGY1  Can be reached on Teams    Patient is a 69y old  Male who presents with a chief complaint of Elevated LFTS (27 Aug 2024 08:51)      SUBJECTIVE / OVERNIGHT EVENTS:    Patient seen and examined. No acute events overnight. Patient had been tolerating a liquid diet for the past 3 days but vomited once this morning. Denies headache, cough, congestion, shortness of breath, chest pain, abdominal pain, changes in bowel movements, changes in urination.    ADDITIONAL REVIEW OF SYSTEMS:  GENERAL: No fever  PULM: No shortness of breath  CARDIOVASCULAR: No chest pain, no palpitations  GI: No abdominal pain, no diarrhea, no constipation  : No dysuria, no urinary frequency  MSK: No arthralgia, no myalgia  SKIN: No rashes, no lesions, no itching  NEURO: No weakness, no loss of sensation    MEDICATIONS  (STANDING):  chlorhexidine 2% Cloths 1 Application(s) Topical daily  dextrose 5%. 1000 milliLiter(s) (100 mL/Hr) IV Continuous <Continuous>  dextrose 5%. 1000 milliLiter(s) (50 mL/Hr) IV Continuous <Continuous>  dextrose 50% Injectable 25 Gram(s) IV Push once  dextrose 50% Injectable 12.5 Gram(s) IV Push once  dextrose 50% Injectable 25 Gram(s) IV Push once  folic acid Injectable 1 milliGRAM(s) IV Push daily  glucagon  Injectable 1 milliGRAM(s) IntraMuscular once  heparin   Injectable 5000 Unit(s) SubCutaneous every 8 hours  insulin lispro (ADMELOG) corrective regimen sliding scale   SubCutaneous three times a day before meals  insulin lispro (ADMELOG) corrective regimen sliding scale   SubCutaneous at bedtime  lactated ringers. 1000 milliLiter(s) (100 mL/Hr) IV Continuous <Continuous>  melatonin 3 milliGRAM(s) Oral at bedtime  multivitamin 1 Tablet(s) Oral daily  pantoprazole    Tablet 40 milliGRAM(s) Oral two times a day  polyethylene glycol 3350 17 Gram(s) Oral daily  tamsulosin 0.4 milliGRAM(s) Oral at bedtime    MEDICATIONS  (PRN):  dextrose Oral Gel 15 Gram(s) Oral once PRN Blood Glucose LESS THAN 70 milliGRAM(s)/deciliter      CAPILLARY BLOOD GLUCOSE      POCT Blood Glucose.: 113 mg/dL (27 Aug 2024 21:18)  POCT Blood Glucose.: 120 mg/dL (27 Aug 2024 17:55)  POCT Blood Glucose.: 119 mg/dL (27 Aug 2024 12:27)  POCT Blood Glucose.: 105 mg/dL (27 Aug 2024 08:39)    I&O's Summary      PHYSICAL EXAM:  Vital Signs Last 24 Hrs  T(C): 36.9 (28 Aug 2024 05:44), Max: 36.9 (28 Aug 2024 05:44)  T(F): 98.4 (28 Aug 2024 05:44), Max: 98.4 (28 Aug 2024 05:44)  HR: 79 (28 Aug 2024 05:44) (79 - 83)  BP: 100/55 (28 Aug 2024 05:44) (100/55 - 116/87)  BP(mean): --  RR: 17 (28 Aug 2024 05:44) (17 - 17)  SpO2: 98% (28 Aug 2024 05:44) (94% - 98%)    Parameters below as of 28 Aug 2024 05:44  Patient On (Oxygen Delivery Method): room air        GENERAL: NAD  HEENT: EOMI, no conjunctival injection, no scleral icterus, moist mucous membranes  RESPIRATORY: Normal respiratory effort  CARDIOVASCULAR: Regular rate and rhythm, normal S1 and S2  GI: Nontender to palpation, normoactive bowel sounds, no rebound/guarding  MUSCULOSKELETAL: ROM grossly normal; no joint swelling or tenderness to palpation  NEURO: No focal deficits, moves all limbs spontaneously  PSYCH: A+O to person, place, and time; affect appropriate    LABS:                        13.6   11.52 )-----------( 263      ( 27 Aug 2024 06:00 )             38.9     08-27    136  |  101  |  5<L>  ----------------------------<  85  3.7   |  21<L>  |  0.91    Ca    8.5      27 Aug 2024 06:00  Phos  2.2     08-27  Mg     1.60     08-27    TPro  6.0  /  Alb  2.9<L>  /  TBili  3.8<H>  /  DBili  x   /  AST  96<H>  /  ALT  21  /  AlkPhos  148<H>  08-27          Urinalysis Basic - ( 27 Aug 2024 06:00 )    Color: x / Appearance: x / SG: x / pH: x  Gluc: 85 mg/dL / Ketone: x  / Bili: x / Urobili: x   Blood: x / Protein: x / Nitrite: x   Leuk Esterase: x / RBC: x / WBC x   Sq Epi: x / Non Sq Epi: x / Bacteria: x          RADIOLOGY & ADDITIONAL TESTS:  Results Reviewed: Y  Imaging Personally Reviewed: Y    COORDINATION OF CARE:  Care Discussed with Consultants/Other Providers [Y/N]: Y  Prior or Outpatient Records Reviewed [Y/N]: Y

## 2024-08-28 NOTE — PROGRESS NOTE ADULT - ATTENDING COMMENTS
69M HTN, DM2, p/w transaminitis with hyperbilirubinemia from EtOH abuse, c/b acute UTI, c/b acute metabolic encephalopathy, unable to tolerate PO intake.    Inability to tolerate PO  - concern for gastroparesis  - advance to regular diet and monitor for toleration  - however continues to have N/V even with full liquid diet  - will order gastric motility imaging - can only be done on Friday    Transaminitis w/ hyperbilirubinemia  - avoid hepatotoxic agents  - f/u hepatology recs    Acute UTI  - s/p 3 days of CTX IV    Acute metabolic encephalopathy likely due to Acute UTI  - resolved.

## 2024-08-29 PROCEDURE — 99233 SBSQ HOSP IP/OBS HIGH 50: CPT | Mod: GC

## 2024-08-29 RX ADMIN — Medication 5000 UNIT(S): at 13:55

## 2024-08-29 RX ADMIN — Medication 5000 UNIT(S): at 06:36

## 2024-08-29 RX ADMIN — Medication 1 TABLET(S): at 12:07

## 2024-08-29 RX ADMIN — Medication 3 MILLIGRAM(S): at 22:12

## 2024-08-29 RX ADMIN — Medication 5000 UNIT(S): at 22:12

## 2024-08-29 RX ADMIN — CHLORHEXIDINE GLUCONATE 1 APPLICATION(S): 40 SOLUTION TOPICAL at 12:07

## 2024-08-29 RX ADMIN — Medication 1 MILLIGRAM(S): at 12:07

## 2024-08-29 RX ADMIN — TAMSULOSIN HYDROCHLORIDE 0.4 MILLIGRAM(S): 0.4 CAPSULE ORAL at 22:11

## 2024-08-29 NOTE — PROGRESS NOTE ADULT - ATTENDING COMMENTS
69M HTN, DM2, p/w transaminitis with hyperbilirubinemia from EtOH abuse, c/b acute UTI, c/b acute metabolic encephalopathy, unable to tolerate PO intake.    Inability to tolerate PO  - concern for gastroparesis  - continues to have N/V  - will order gastric motility imaging - can only be done on Friday    Transaminitis w/ hyperbilirubinemia  - avoid hepatotoxic agents  - outpatient hepatology f/u    Acute UTI  - s/p 3 days of CTX IV    Acute metabolic encephalopathy likely due to Acute UTI  - resolved

## 2024-08-29 NOTE — PROGRESS NOTE ADULT - TIME BILLING
- Ordering, reviewing, and interpreting labs, testing, and imaging.  - Independently obtaining a review of systems and performing a physical exam  - Reviewing prior hospitalization and where necessary, outpatient records.  - Reviewing consultant recommendations/communicating with consultants  - Counselling and educating patient and family regarding interpretation of aforementioned items and plan of care.
- Ordering, reviewing, and interpreting labs, testing, and imaging.  - Independently obtaining a review of systems and performing a physical exam  - Reviewing prior hospitalization and where necessary, outpatient records.  - Reviewing consultant recommendations/communicating with consultants  - Counselling and educating patient and family regarding interpretation of aforementioned items and plan of care.
Preparing to see the patient including review of tests and other providers' notes, confirming history with family member, performing medical examination and evaluation, counseling and educating the patient/family/caregiver, ordering medications, tests and procedures, communicating with other health care professionals, documenting clinical information in the EMR, independently interpreting results and communicating results to the patient/family/caregiven, care coordination.
Reviewed lab data, radiology results, consultants' recommendations, documentation in [place], discussed with family, ACP, interdisciplinary staff and/or intervention were performed.

## 2024-08-29 NOTE — DIETITIAN INITIAL EVALUATION ADULT - ADD RECOMMEND
Adjust diet to consistent carbohydrate, lacto-ovo vegetarian. Continue w/ micronutrient supplementation. Monitor PO intake.

## 2024-08-29 NOTE — PROGRESS NOTE ADULT - PROBLEM SELECTOR PLAN 5
Patient's SBP at normal limits- will hold Amlodipine. Family reports he has not been taking it  -Will restart if BP elevates.

## 2024-08-29 NOTE — DIETITIAN INITIAL EVALUATION ADULT - ORAL INTAKE PTA/DIET HISTORY
Patient seen for assessment. Information obtained from daughter over phone. Reports patient w/ poor appetite a few weeks at home. Follows a lacto-ovo vegetarian diet. A1c is 7.7% per chart.

## 2024-08-29 NOTE — DIETITIAN INITIAL EVALUATION ADULT - OTHER INFO
69 year old male with a PMH of HTN, DM, HLD and recently diagnosed hepatic steatosis presenting to the hospital due to abnormal liver enzymes. 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 Pending gastric motility study on 8/30 per chart.    Patient w/ reduced appetite in house. Unable to determine overall PO intake per chart. Has N/V and is pending gastric motility study for concern of gastroparesis per chart. Has no food allergies. UBW is 134 kg, but unable to provide time frame last that weight. No recent weights per HIE. ABW is 106.6 kg (8/26) per chart indicating a -20.5% weight loss. Noted w/ +2 L/R foot, ankle, leg and knee edema w/ no pressure injuries per RN flow sheet.    Patient and family w/ no questions regarding diet at this time.

## 2024-08-29 NOTE — PROGRESS NOTE ADULT - SUBJECTIVE AND OBJECTIVE BOX
Jessa Alvarez PGY1  Can be reached on Teams    Patient is a 69y old  Male who presents with a chief complaint of Elevated LFTS (28 Aug 2024 08:02)      SUBJECTIVE / OVERNIGHT EVENTS:    Patient seen and examined. No acute events overnight.    ADDITIONAL REVIEW OF SYSTEMS:  GENERAL: No fever, no chills  PULM: No shortness of breath  CARDIOVASCULAR: No chest pain, no palpitations  GI: No nausea, no vomiting, no abdominal pain, no diarrhea, no constipation  : No dysuria, no urinary frequency  MSK: No arthralgia, no myalgia  SKIN: No rashes, no lesions, no itching  NEURO: No weakness, no loss of sensation    MEDICATIONS  (STANDING):  chlorhexidine 2% Cloths 1 Application(s) Topical daily  dextrose 5%. 1000 milliLiter(s) (50 mL/Hr) IV Continuous <Continuous>  dextrose 5%. 1000 milliLiter(s) (100 mL/Hr) IV Continuous <Continuous>  dextrose 50% Injectable 12.5 Gram(s) IV Push once  dextrose 50% Injectable 25 Gram(s) IV Push once  dextrose 50% Injectable 25 Gram(s) IV Push once  folic acid Injectable 1 milliGRAM(s) IV Push daily  glucagon  Injectable 1 milliGRAM(s) IntraMuscular once  heparin   Injectable 5000 Unit(s) SubCutaneous every 8 hours  insulin lispro (ADMELOG) corrective regimen sliding scale   SubCutaneous three times a day before meals  insulin lispro (ADMELOG) corrective regimen sliding scale   SubCutaneous at bedtime  lactated ringers. 1000 milliLiter(s) (100 mL/Hr) IV Continuous <Continuous>  melatonin 3 milliGRAM(s) Oral at bedtime  multivitamin 1 Tablet(s) Oral daily  polyethylene glycol 3350 17 Gram(s) Oral daily  tamsulosin 0.4 milliGRAM(s) Oral at bedtime    MEDICATIONS  (PRN):  dextrose Oral Gel 15 Gram(s) Oral once PRN Blood Glucose LESS THAN 70 milliGRAM(s)/deciliter      CAPILLARY BLOOD GLUCOSE      POCT Blood Glucose.: 106 mg/dL (28 Aug 2024 21:11)  POCT Blood Glucose.: 100 mg/dL (28 Aug 2024 17:53)  POCT Blood Glucose.: 97 mg/dL (28 Aug 2024 12:21)  POCT Blood Glucose.: 96 mg/dL (28 Aug 2024 08:33)    I&O's Summary      PHYSICAL EXAM:  Vital Signs Last 24 Hrs  T(C): 36.8 (28 Aug 2024 21:33), Max: 36.8 (28 Aug 2024 21:33)  T(F): 98.2 (28 Aug 2024 21:33), Max: 98.2 (28 Aug 2024 21:33)  HR: 72 (28 Aug 2024 21:33) (72 - 73)  BP: 105/67 (28 Aug 2024 21:33) (100/60 - 105/67)  BP(mean): --  RR: 17 (28 Aug 2024 21:33) (17 - 17)  SpO2: 97% (28 Aug 2024 21:33) (97% - 98%)    Parameters below as of 28 Aug 2024 21:33  Patient On (Oxygen Delivery Method): room air        GENERAL: NAD  HEENT: EOMI, no conjunctival injection, no scleral icterus, moist mucous membranes  RESPIRATORY: Normal respiratory effort; lungs are clear to auscultation bilaterally  CARDIOVASCULAR: Regular rate and rhythm, normal S1 and S2, no murmur/rub/gallop; No lower extremity edema; Peripheral pulses are 2+ bilaterally  GI: Nontender to palpation, normoactive bowel sounds, no rebound/guarding  MUSCULOSKELETAL: ROM grossly normal; no joint swelling or tenderness to palpation  NEURO: No focal deficits, moves all limbs spontaneously  PSYCH: A+O to person, place, and time; affect appropriate    LABS:                      RADIOLOGY & ADDITIONAL TESTS:  Results Reviewed: Y  Imaging Personally Reviewed: Y    COORDINATION OF CARE:  Care Discussed with Consultants/Other Providers [Y/N]: Y  Prior or Outpatient Records Reviewed [Y/N]: Y     Jessa Alvarez PGY1  Can be reached on Teams    Patient is a 69y old  Male who presents with a chief complaint of Elevated LFTS (28 Aug 2024 08:02)      SUBJECTIVE / OVERNIGHT EVENTS:    Patient seen and examined. [employer]  utilized (ID [id number]) No acute events overnight. Patient feels well this morning and was eating a bowl of fruit at time of examination. Denies nausea. He would like to go home and states that he has not been sleeping well while in the hospital.    ADDITIONAL REVIEW OF SYSTEMS:  GENERAL: No fever, no chills  PULM: No shortness of breath  CARDIOVASCULAR: No chest pain  GI: No nausea, no abdominal pain, no diarrhea, no constipation  : No dysuria, no urinary frequency  MSK: No arthralgia, no myalgia  SKIN: No rashes, no lesions, no itching  NEURO: No weakness, no loss of sensation    MEDICATIONS  (STANDING):  chlorhexidine 2% Cloths 1 Application(s) Topical daily  dextrose 5%. 1000 milliLiter(s) (50 mL/Hr) IV Continuous <Continuous>  dextrose 5%. 1000 milliLiter(s) (100 mL/Hr) IV Continuous <Continuous>  dextrose 50% Injectable 12.5 Gram(s) IV Push once  dextrose 50% Injectable 25 Gram(s) IV Push once  dextrose 50% Injectable 25 Gram(s) IV Push once  folic acid Injectable 1 milliGRAM(s) IV Push daily  glucagon  Injectable 1 milliGRAM(s) IntraMuscular once  heparin   Injectable 5000 Unit(s) SubCutaneous every 8 hours  insulin lispro (ADMELOG) corrective regimen sliding scale   SubCutaneous three times a day before meals  insulin lispro (ADMELOG) corrective regimen sliding scale   SubCutaneous at bedtime  lactated ringers. 1000 milliLiter(s) (100 mL/Hr) IV Continuous <Continuous>  melatonin 3 milliGRAM(s) Oral at bedtime  multivitamin 1 Tablet(s) Oral daily  polyethylene glycol 3350 17 Gram(s) Oral daily  tamsulosin 0.4 milliGRAM(s) Oral at bedtime    MEDICATIONS  (PRN):  dextrose Oral Gel 15 Gram(s) Oral once PRN Blood Glucose LESS THAN 70 milliGRAM(s)/deciliter      CAPILLARY BLOOD GLUCOSE      POCT Blood Glucose.: 106 mg/dL (28 Aug 2024 21:11)  POCT Blood Glucose.: 100 mg/dL (28 Aug 2024 17:53)  POCT Blood Glucose.: 97 mg/dL (28 Aug 2024 12:21)  POCT Blood Glucose.: 96 mg/dL (28 Aug 2024 08:33)    I&O's Summary      PHYSICAL EXAM:  Vital Signs Last 24 Hrs  T(C): 36.8 (28 Aug 2024 21:33), Max: 36.8 (28 Aug 2024 21:33)  T(F): 98.2 (28 Aug 2024 21:33), Max: 98.2 (28 Aug 2024 21:33)  HR: 72 (28 Aug 2024 21:33) (72 - 73)  BP: 105/67 (28 Aug 2024 21:33) (100/60 - 105/67)  BP(mean): --  RR: 17 (28 Aug 2024 21:33) (17 - 17)  SpO2: 97% (28 Aug 2024 21:33) (97% - 98%)    Parameters below as of 28 Aug 2024 21:33  Patient On (Oxygen Delivery Method): room air        GENERAL: NAD  HEENT: EOMI, no conjunctival injection, no scleral icterus, moist mucous membranes  RESPIRATORY: Normal respiratory effort; lungs are clear to auscultation bilaterally  CARDIOVASCULAR: Regular rate and rhythm, normal S1 and S2  GI: Nontender to palpation, normoactive bowel sounds, no rebound/guarding. +Umbilical hernia, known, present on previous exams  MUSCULOSKELETAL: ROM grossly normal; no joint swelling or tenderness to palpation  NEURO: No focal deficits, moves all limbs spontaneously  PSYCH: A+O to person, place, and time; affect appropriate    LABS:      RADIOLOGY & ADDITIONAL TESTS:  Results Reviewed: Y  Imaging Personally Reviewed: Y    COORDINATION OF CARE:  Care Discussed with Consultants/Other Providers [Y/N]: Y  Prior or Outpatient Records Reviewed [Y/N]: Y

## 2024-08-29 NOTE — PROGRESS NOTE ADULT - PROBLEM SELECTOR PLAN 7
Diet: Regular  DVT ppx: Heparin 5k TID  Dispo: Home pending PO tolerance and gastric motility study.

## 2024-08-29 NOTE — PROGRESS NOTE ADULT - PROBLEM SELECTOR PLAN 1
CT A/P w/o evidence of structural abnormalities or gastric wall thickening. No hx of dysphagia or odynophagia.  - Vomits after solid feeds  Plan:  - EGD results: Gastritis at gastric antrum, no varices  - Gastric motility study on Friday 8/30  - On regular diet, monitor tolerance

## 2024-08-29 NOTE — DIETITIAN INITIAL EVALUATION ADULT - PROBLEM SELECTOR PLAN 3
-Patient presenting with Bicarb 20 and AG-16  -Likely starvation ketosis, but will also consider DKA  Plan  -Ordered VBG and Beta-Hydroxybutyrate

## 2024-08-29 NOTE — PROGRESS NOTE ADULT - ASSESSMENT
Patient is a 70 y/o male with pmh of HTN, DM, HLD and recently diagnosed hepatic steatosis presenting to the hospital due to abnormal liver enzymes. 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 EGD was performed; no varices. Continues to have poor PO tolerance. Pending gastric motility study on 8/30.

## 2024-08-29 NOTE — DIETITIAN INITIAL EVALUATION ADULT - PERTINENT MEDS FT
MEDICATIONS  (STANDING):  chlorhexidine 2% Cloths 1 Application(s) Topical daily  dextrose 5%. 1000 milliLiter(s) (50 mL/Hr) IV Continuous <Continuous>  dextrose 5%. 1000 milliLiter(s) (100 mL/Hr) IV Continuous <Continuous>  dextrose 50% Injectable 12.5 Gram(s) IV Push once  dextrose 50% Injectable 25 Gram(s) IV Push once  dextrose 50% Injectable 25 Gram(s) IV Push once  folic acid Injectable 1 milliGRAM(s) IV Push daily  glucagon  Injectable 1 milliGRAM(s) IntraMuscular once  heparin   Injectable 5000 Unit(s) SubCutaneous every 8 hours  insulin lispro (ADMELOG) corrective regimen sliding scale   SubCutaneous at bedtime  insulin lispro (ADMELOG) corrective regimen sliding scale   SubCutaneous three times a day before meals  lactated ringers. 1000 milliLiter(s) (100 mL/Hr) IV Continuous <Continuous>  melatonin 3 milliGRAM(s) Oral at bedtime  multivitamin 1 Tablet(s) Oral daily  polyethylene glycol 3350 17 Gram(s) Oral daily  tamsulosin 0.4 milliGRAM(s) Oral at bedtime    MEDICATIONS  (PRN):  dextrose Oral Gel 15 Gram(s) Oral once PRN Blood Glucose LESS THAN 70 milliGRAM(s)/deciliter

## 2024-08-29 NOTE — PROGRESS NOTE ADULT - PROBLEM SELECTOR PLAN 3
Patient admits to alcohol use and family also report that he has consumed alcohol heavily in the past  Family states he has not been seen drinking in 2 weeks     Plan  -Continue with Thiamine and Folate.

## 2024-08-29 NOTE — DIETITIAN INITIAL EVALUATION ADULT - PERTINENT LABORATORY DATA
POCT Blood Glucose.: 103 mg/dL (08-29-24 @ 08:27)  A1C with Estimated Average Glucose Result: 7.7 % (08-23-24 @ 07:20)  A1C with Estimated Average Glucose Result: 8.1 % (08-22-24 @ 14:27)

## 2024-08-30 VITALS
DIASTOLIC BLOOD PRESSURE: 62 MMHG | RESPIRATION RATE: 18 BRPM | HEART RATE: 78 BPM | SYSTOLIC BLOOD PRESSURE: 108 MMHG | TEMPERATURE: 98 F | OXYGEN SATURATION: 99 %

## 2024-08-30 LAB
ANION GAP SERPL CALC-SCNC: 12 MMOL/L — SIGNIFICANT CHANGE UP (ref 7–14)
APTT BLD: 39.3 SEC — HIGH (ref 24.5–35.6)
BUN SERPL-MCNC: 5 MG/DL — LOW (ref 7–23)
CALCIUM SERPL-MCNC: 8.5 MG/DL — SIGNIFICANT CHANGE UP (ref 8.4–10.5)
CHLORIDE SERPL-SCNC: 99 MMOL/L — SIGNIFICANT CHANGE UP (ref 98–107)
CO2 SERPL-SCNC: 22 MMOL/L — SIGNIFICANT CHANGE UP (ref 22–31)
CREAT SERPL-MCNC: 0.95 MG/DL — SIGNIFICANT CHANGE UP (ref 0.5–1.3)
EGFR: 88 ML/MIN/1.73M2 — SIGNIFICANT CHANGE UP
GLUCOSE SERPL-MCNC: 104 MG/DL — HIGH (ref 70–99)
HCT VFR BLD CALC: 38.1 % — LOW (ref 39–50)
HGB BLD-MCNC: 13.8 G/DL — SIGNIFICANT CHANGE UP (ref 13–17)
INR BLD: 1.94 RATIO — HIGH (ref 0.85–1.18)
MAGNESIUM SERPL-MCNC: 1.5 MG/DL — LOW (ref 1.6–2.6)
MCHC RBC-ENTMCNC: 31.7 PG — SIGNIFICANT CHANGE UP (ref 27–34)
MCHC RBC-ENTMCNC: 36.2 GM/DL — HIGH (ref 32–36)
MCV RBC AUTO: 87.4 FL — SIGNIFICANT CHANGE UP (ref 80–100)
NRBC # BLD: 0 /100 WBCS — SIGNIFICANT CHANGE UP (ref 0–0)
NRBC # FLD: 0 K/UL — SIGNIFICANT CHANGE UP (ref 0–0)
PHOSPHATE SERPL-MCNC: 2.2 MG/DL — LOW (ref 2.5–4.5)
PLATELET # BLD AUTO: 236 K/UL — SIGNIFICANT CHANGE UP (ref 150–400)
POTASSIUM SERPL-MCNC: 4.1 MMOL/L — SIGNIFICANT CHANGE UP (ref 3.5–5.3)
POTASSIUM SERPL-SCNC: 4.1 MMOL/L — SIGNIFICANT CHANGE UP (ref 3.5–5.3)
PROTHROM AB SERPL-ACNC: 21.3 SEC — HIGH (ref 9.5–13)
RBC # BLD: 4.36 M/UL — SIGNIFICANT CHANGE UP (ref 4.2–5.8)
RBC # FLD: 16.3 % — HIGH (ref 10.3–14.5)
SODIUM SERPL-SCNC: 133 MMOL/L — LOW (ref 135–145)
WBC # BLD: 12.3 K/UL — HIGH (ref 3.8–10.5)
WBC # FLD AUTO: 12.3 K/UL — HIGH (ref 3.8–10.5)

## 2024-08-30 PROCEDURE — 99239 HOSP IP/OBS DSCHRG MGMT >30: CPT | Mod: GC

## 2024-08-30 RX ORDER — ROSUVASTATIN CALCIUM 10 MG/1
1 TABLET ORAL
Qty: 30 | Refills: 0
Start: 2024-08-30 | End: 2024-09-28

## 2024-08-30 RX ORDER — EMPAGLIFLOZIN 10 MG/1
1 TABLET, FILM COATED ORAL
Refills: 0 | DISCHARGE

## 2024-08-30 RX ORDER — EMPAGLIFLOZIN 10 MG/1
1 TABLET, FILM COATED ORAL
Qty: 30 | Refills: 0
Start: 2024-08-30 | End: 2024-09-28

## 2024-08-30 RX ORDER — METOCLOPRAMIDE HCL 5 MG
1 TABLET ORAL
Qty: 90 | Refills: 0
Start: 2024-08-30 | End: 2024-09-28

## 2024-08-30 RX ORDER — AMLODIPINE BESYLATE 10 MG/1
1 TABLET ORAL
Qty: 30 | Refills: 0
Start: 2024-08-30 | End: 2024-09-28

## 2024-08-30 RX ORDER — ROSUVASTATIN CALCIUM 10 MG/1
1 TABLET ORAL
Refills: 0 | DISCHARGE

## 2024-08-30 RX ORDER — AMLODIPINE BESYLATE 10 MG/1
1 TABLET ORAL
Refills: 0 | DISCHARGE

## 2024-08-30 RX ORDER — METOCLOPRAMIDE HCL 5 MG
5 TABLET ORAL
Refills: 0 | Status: DISCONTINUED | OUTPATIENT
Start: 2024-08-30 | End: 2024-08-30

## 2024-08-30 RX ORDER — OMEPRAZOLE 40 MG/1
1 CAPSULE, DELAYED RELEASE ORAL
Qty: 30 | Refills: 0
Start: 2024-08-30 | End: 2024-09-28

## 2024-08-30 RX ORDER — METFORMIN HYDROCHLORIDE 850 MG/1
1 TABLET, FILM COATED ORAL
Qty: 30 | Refills: 0
Start: 2024-08-30 | End: 2024-09-28

## 2024-08-30 RX ORDER — TAMSULOSIN HYDROCHLORIDE 0.4 MG/1
1 CAPSULE ORAL
Qty: 30 | Refills: 0
Start: 2024-08-30 | End: 2024-09-28

## 2024-08-30 RX ORDER — OMEPRAZOLE 40 MG/1
1 CAPSULE, DELAYED RELEASE ORAL
Refills: 0 | DISCHARGE

## 2024-08-30 RX ORDER — METFORMIN HYDROCHLORIDE 850 MG/1
1 TABLET, FILM COATED ORAL
Refills: 0 | DISCHARGE

## 2024-08-30 RX ADMIN — Medication 1 MILLIGRAM(S): at 12:25

## 2024-08-30 RX ADMIN — Medication 5000 UNIT(S): at 06:07

## 2024-08-30 RX ADMIN — Medication 5000 UNIT(S): at 15:28

## 2024-08-30 RX ADMIN — Medication 25 GRAM(S): at 06:07

## 2024-08-30 RX ADMIN — CHLORHEXIDINE GLUCONATE 1 APPLICATION(S): 40 SOLUTION TOPICAL at 12:27

## 2024-08-30 RX ADMIN — Medication 5 MILLIGRAM(S): at 18:25

## 2024-08-30 NOTE — PROGRESS NOTE ADULT - PROBLEM SELECTOR PROBLEM 7
Prophylactic measure
HLD (hyperlipidemia)
Prophylactic measure
Prophylactic measure
HLD (hyperlipidemia)

## 2024-08-30 NOTE — PROGRESS NOTE ADULT - PROBLEM SELECTOR PLAN 1
CT A/P w/o evidence of structural abnormalities or gastric wall thickening. No hx of dysphagia or odynophagia.  - Vomits after solid feeds  Plan:  - Gastric motility study on Friday 8/30  - On regular diet, monitor tolerance. CT A/P w/o evidence of structural abnormalities or gastric wall thickening. No hx of dysphagia or odynophagia.  - Vomits after solid feeds  Plan:  - Refused gastric motility study  - On regular diet, monitor tolerance.

## 2024-08-30 NOTE — PROGRESS NOTE ADULT - PROBLEM SELECTOR PLAN 2
-CTAP on 8/22 showed Hepatic steatosis; Innumerable hypoattenuating subcentimeter nodules throughout the liver parenchyma, suggestive of regenerative nodules.   - serum drug screen negative, EtOH negative  -hepatitis neg  -Unlikely to be cirrhosis, patient is compensated  -EGD: Gastritis at gastric antrum, no varices    Plan  - Safe for discharge from hepatology standpoint, will need outpatient follow-up.

## 2024-08-30 NOTE — PROGRESS NOTE ADULT - PROBLEM SELECTOR PROBLEM 1
Abdominal pain
Cirrhosis
Abdominal pain

## 2024-08-30 NOTE — PROGRESS NOTE ADULT - PROVIDER SPECIALTY LIST ADULT
Hepatology
Anesthesia
Hepatology
Internal Medicine

## 2024-08-30 NOTE — PROGRESS NOTE ADULT - ATTENDING COMMENTS
69M HTN, DM2, p/w transaminitis with hyperbilirubinemia from EtOH abuse, c/b acute UTI, c/b acute metabolic encephalopathy, unable to tolerate PO intake.    Inability to tolerate PO  - concern for gastroparesis  - continues to have N/V but tolerated some PO intake  - patient refused gastric motility study despite explaining the reasoning. Understood that we would not be able to properly evaluate his continued N/V without this study but still refusing.  - recommend outpatient follow up w/ GI and hepatology.    Transaminitis w/ hyperbilirubinemia  - avoid hepatotoxic agents  - outpatient hepatology f/u    Acute UTI  - s/p 3 days of CTX IV    Acute metabolic encephalopathy likely due to Acute UTI  - resolved     Patient medically optimized for discharge.  Discharge planning 40 minutes - discussed with patient and consultants.

## 2024-08-30 NOTE — PROGRESS NOTE ADULT - SUBJECTIVE AND OBJECTIVE BOX
Jessa Alvarez PGY1  Can be reached on Teams    Patient is a 69y old  Male who presents with a chief complaint of Fatty (change of) liver, not elsewhere classified     (29 Aug 2024 11:49)      SUBJECTIVE / OVERNIGHT EVENTS:    Patient seen and examined. [employer]  utilized (ID _____). No acute events overnight.    ADDITIONAL REVIEW OF SYSTEMS:  GENERAL: No fever, no chills  PULM: No shortness of breath  CARDIOVASCULAR: No chest pain, no palpitations  GI: No nausea, no vomiting, no abdominal pain, no diarrhea, no constipation  : No dysuria, no urinary frequency  MSK: No arthralgia, no myalgia  SKIN: No rashes, no lesions, no itching  NEURO: No weakness, no loss of sensation    MEDICATIONS  (STANDING):  chlorhexidine 2% Cloths 1 Application(s) Topical daily  dextrose 5%. 1000 milliLiter(s) (100 mL/Hr) IV Continuous <Continuous>  dextrose 5%. 1000 milliLiter(s) (50 mL/Hr) IV Continuous <Continuous>  dextrose 50% Injectable 12.5 Gram(s) IV Push once  dextrose 50% Injectable 25 Gram(s) IV Push once  dextrose 50% Injectable 25 Gram(s) IV Push once  folic acid Injectable 1 milliGRAM(s) IV Push daily  glucagon  Injectable 1 milliGRAM(s) IntraMuscular once  heparin   Injectable 5000 Unit(s) SubCutaneous every 8 hours  insulin lispro (ADMELOG) corrective regimen sliding scale   SubCutaneous every 6 hours  lactated ringers. 1000 milliLiter(s) (100 mL/Hr) IV Continuous <Continuous>  melatonin 3 milliGRAM(s) Oral at bedtime  multivitamin 1 Tablet(s) Oral daily  polyethylene glycol 3350 17 Gram(s) Oral daily  tamsulosin 0.4 milliGRAM(s) Oral at bedtime    MEDICATIONS  (PRN):  dextrose Oral Gel 15 Gram(s) Oral once PRN Blood Glucose LESS THAN 70 milliGRAM(s)/deciliter      CAPILLARY BLOOD GLUCOSE      POCT Blood Glucose.: 92 mg/dL (30 Aug 2024 06:15)  POCT Blood Glucose.: 117 mg/dL (29 Aug 2024 23:30)  POCT Blood Glucose.: 109 mg/dL (29 Aug 2024 17:29)  POCT Blood Glucose.: 108 mg/dL (29 Aug 2024 12:23)  POCT Blood Glucose.: 103 mg/dL (29 Aug 2024 08:27)    I&O's Summary      PHYSICAL EXAM:  Vital Signs Last 24 Hrs  T(C): 36.9 (29 Aug 2024 21:10), Max: 36.9 (29 Aug 2024 21:10)  T(F): 98.4 (29 Aug 2024 21:10), Max: 98.4 (29 Aug 2024 21:10)  HR: 75 (29 Aug 2024 21:10) (75 - 80)  BP: 104/53 (29 Aug 2024 21:10) (104/53 - 114/62)  BP(mean): --  RR: 18 (29 Aug 2024 21:10) (18 - 18)  SpO2: 97% (29 Aug 2024 21:10) (97% - 97%)    Parameters below as of 29 Aug 2024 21:10  Patient On (Oxygen Delivery Method): room air        GENERAL: NAD  HEENT: EOMI, no conjunctival injection, no scleral icterus, moist mucous membranes  RESPIRATORY: Normal respiratory effort; lungs are clear to auscultation bilaterally  CARDIOVASCULAR: Regular rate and rhythm, normal S1 and S2, no murmur/rub/gallop; No lower extremity edema; Peripheral pulses are 2+ bilaterally  GI: Nontender to palpation, normoactive bowel sounds, no rebound/guarding  MUSCULOSKELETAL: ROM grossly normal; no joint swelling or tenderness to palpation  NEURO: No focal deficits, moves all limbs spontaneously  PSYCH: A+O to person, place, and time; affect appropriate    LABS:                        13.8   12.30 )-----------( 236      ( 30 Aug 2024 04:01 )             38.1     08-30    133<L>  |  99  |  5<L>  ----------------------------<  104<H>  4.1   |  22  |  0.95    Ca    8.5      30 Aug 2024 04:01  Phos  2.2     08-30  Mg     1.50     08-30      PT/INR - ( 30 Aug 2024 04:01 )   PT: 21.3 sec;   INR: 1.94 ratio         PTT - ( 30 Aug 2024 04:01 )  PTT:39.3 sec      Urinalysis Basic - ( 30 Aug 2024 04:01 )    Color: x / Appearance: x / SG: x / pH: x  Gluc: 104 mg/dL / Ketone: x  / Bili: x / Urobili: x   Blood: x / Protein: x / Nitrite: x   Leuk Esterase: x / RBC: x / WBC x   Sq Epi: x / Non Sq Epi: x / Bacteria: x          RADIOLOGY & ADDITIONAL TESTS:  Results Reviewed: Y  Imaging Personally Reviewed: Y    COORDINATION OF CARE:  Care Discussed with Consultants/Other Providers [Y/N]: Y  Prior or Outpatient Records Reviewed [Y/N]: Y     Jessa Alvarez PGY1  Can be reached on Teams    Patient is a 69y old  Male who presents with a chief complaint of Fatty (change of) liver, not elsewhere classified     (29 Aug 2024 11:49)      SUBJECTIVE / OVERNIGHT EVENTS:    Patient seen in nuclear medicine. No acute events overnight. Patient is refusing the gastric motility study. He states that he is too weak to move from his bed to the chair. He believes staying in the hospital is affecting his mental health, and would like to go home. He believes that his weakness will resolve and he'll get better after he goes home.    ADDITIONAL REVIEW OF SYSTEMS:  GENERAL: No fever  PULM: No shortness of breath  CARDIOVASCULAR: No chest pain  GI: No abdominal pain, no diarrhea, no constipation  : No dysuria, no urinary frequency  MSK: No arthralgia, no myalgia  SKIN: No rashes, no itching  NEURO: No focal weakness, no loss of sensation    MEDICATIONS  (STANDING):  chlorhexidine 2% Cloths 1 Application(s) Topical daily  dextrose 5%. 1000 milliLiter(s) (100 mL/Hr) IV Continuous <Continuous>  dextrose 5%. 1000 milliLiter(s) (50 mL/Hr) IV Continuous <Continuous>  dextrose 50% Injectable 12.5 Gram(s) IV Push once  dextrose 50% Injectable 25 Gram(s) IV Push once  dextrose 50% Injectable 25 Gram(s) IV Push once  folic acid Injectable 1 milliGRAM(s) IV Push daily  glucagon  Injectable 1 milliGRAM(s) IntraMuscular once  heparin   Injectable 5000 Unit(s) SubCutaneous every 8 hours  insulin lispro (ADMELOG) corrective regimen sliding scale   SubCutaneous every 6 hours  lactated ringers. 1000 milliLiter(s) (100 mL/Hr) IV Continuous <Continuous>  melatonin 3 milliGRAM(s) Oral at bedtime  multivitamin 1 Tablet(s) Oral daily  polyethylene glycol 3350 17 Gram(s) Oral daily  tamsulosin 0.4 milliGRAM(s) Oral at bedtime    MEDICATIONS  (PRN):  dextrose Oral Gel 15 Gram(s) Oral once PRN Blood Glucose LESS THAN 70 milliGRAM(s)/deciliter      CAPILLARY BLOOD GLUCOSE      POCT Blood Glucose.: 92 mg/dL (30 Aug 2024 06:15)  POCT Blood Glucose.: 117 mg/dL (29 Aug 2024 23:30)  POCT Blood Glucose.: 109 mg/dL (29 Aug 2024 17:29)  POCT Blood Glucose.: 108 mg/dL (29 Aug 2024 12:23)  POCT Blood Glucose.: 103 mg/dL (29 Aug 2024 08:27)    I&O's Summary      PHYSICAL EXAM:  Vital Signs Last 24 Hrs  T(C): 36.9 (29 Aug 2024 21:10), Max: 36.9 (29 Aug 2024 21:10)  T(F): 98.4 (29 Aug 2024 21:10), Max: 98.4 (29 Aug 2024 21:10)  HR: 75 (29 Aug 2024 21:10) (75 - 80)  BP: 104/53 (29 Aug 2024 21:10) (104/53 - 114/62)  BP(mean): --  RR: 18 (29 Aug 2024 21:10) (18 - 18)  SpO2: 97% (29 Aug 2024 21:10) (97% - 97%)    Parameters below as of 29 Aug 2024 21:10  Patient On (Oxygen Delivery Method): room air        GENERAL: NAD  HEENT: EOMI, no conjunctival injection, no scleral icterus, moist mucous membranes  RESPIRATORY: Normal respiratory effort  CARDIOVASCULAR: Regular rate and rhythm, normal S1 and S2  GI: Nontender to palpation, no rebound/guarding  MUSCULOSKELETAL: ROM grossly normal; no joint swelling or tenderness to palpation  NEURO: No focal deficits, moves all limbs spontaneously  PSYCH: A+O to person, place, and time; affect appropriate    LABS:                        13.8   12.30 )-----------( 236      ( 30 Aug 2024 04:01 )             38.1     08-30    133<L>  |  99  |  5<L>  ----------------------------<  104<H>  4.1   |  22  |  0.95    Ca    8.5      30 Aug 2024 04:01  Phos  2.2     08-30  Mg     1.50     08-30      PT/INR - ( 30 Aug 2024 04:01 )   PT: 21.3 sec;   INR: 1.94 ratio         PTT - ( 30 Aug 2024 04:01 )  PTT:39.3 sec      Urinalysis Basic - ( 30 Aug 2024 04:01 )    Color: x / Appearance: x / SG: x / pH: x  Gluc: 104 mg/dL / Ketone: x  / Bili: x / Urobili: x   Blood: x / Protein: x / Nitrite: x   Leuk Esterase: x / RBC: x / WBC x   Sq Epi: x / Non Sq Epi: x / Bacteria: x          RADIOLOGY & ADDITIONAL TESTS:  Results Reviewed: Y  Imaging Personally Reviewed: Y    COORDINATION OF CARE:  Care Discussed with Consultants/Other Providers [Y/N]: Y  Prior or Outpatient Records Reviewed [Y/N]: Y

## 2024-08-30 NOTE — PROGRESS NOTE ADULT - ASSESSMENT
Patient is a 70 y/o male with pmh of HTN, DM, HLD and recently diagnosed hepatic steatosis presenting to the hospital due to abnormal liver enzymes. Patients LFT abnormalities are likely secondary to alcohol use. EGD was performed; no varices. Continues to have poor PO tolerance. Pending gastric motility study on 8/30. Patient is a 68 y/o male with pmh of HTN, DM, HLD and recently diagnosed hepatic steatosis presenting to the hospital due to abnormal liver enzymes. Patients LFT abnormalities are likely secondary to alcohol use. EGD was performed; no varices. Continues to have poor PO tolerance but tolerated small amount of solid food.

## 2024-08-30 NOTE — PROVIDER CONTACT NOTE (MEDICATION) - ACTION/TREATMENT ORDERED:
MD notified and made aware. Patient educated on importance of taking medication as prescribed. Patient still refusing. No new interventions ordered at this time.

## 2024-08-30 NOTE — DISCHARGE NOTE NURSING/CASE MANAGEMENT/SOCIAL WORK - PATIENT PORTAL LINK FT
Opt out You can access the FollowMyHealth Patient Portal offered by [facility] by registering at the following website: [email or web address]. By joining [employer]’s FollowMyHealth portal, you will also be able to view your health information using other applications (apps) compatible with our system.

## 2024-08-30 NOTE — PROGRESS NOTE ADULT - PROBLEM SELECTOR PLAN 7
Diet: Regular  DVT ppx: Heparin 5k TID  Dispo: Home pending PO tolerance and gastric motility study. Diet: Regular  DVT ppx: Heparin 5k TID  Dispo: Home

## 2024-08-30 NOTE — DISCHARGE NOTE NURSING/CASE MANAGEMENT/SOCIAL WORK - NSDCPEFALRISK_GEN_ALL_CORE
For information on Fall & Injury Prevention, visit: [email or web address] OR  [email or web address] OR  https://www.cdc.gov/steadi/patient.html

## 2024-08-30 NOTE — PROGRESS NOTE ADULT - REASON FOR ADMISSION
Elevated LFTS

## 2024-09-12 PROBLEM — Z00.00 ENCOUNTER FOR PREVENTIVE HEALTH EXAMINATION: Status: ACTIVE | Noted: 2024-09-12

## 2024-09-14 PROBLEM — E78.5 HYPERLIPIDEMIA, UNSPECIFIED: Chronic | Status: ACTIVE | Noted: 2024-08-22

## 2024-09-14 PROBLEM — K76.0 FATTY (CHANGE OF) LIVER, NOT ELSEWHERE CLASSIFIED: Chronic | Status: ACTIVE | Noted: 2024-08-22

## 2024-10-22 ENCOUNTER — APPOINTMENT (OUTPATIENT)
Dept: HEPATOLOGY | Facility: CLINIC | Age: 69
End: 2024-10-22
Payer: MEDICAID

## 2024-10-22 ENCOUNTER — APPOINTMENT (OUTPATIENT)
Dept: HEPATOLOGY | Facility: CLINIC | Age: 69
End: 2024-10-22

## 2024-10-22 VITALS
OXYGEN SATURATION: 98 % | DIASTOLIC BLOOD PRESSURE: 83 MMHG | HEART RATE: 72 BPM | BODY MASS INDEX: 34.56 KG/M2 | RESPIRATION RATE: 16 BRPM | TEMPERATURE: 98.3 F | HEIGHT: 68 IN | WEIGHT: 228 LBS | SYSTOLIC BLOOD PRESSURE: 131 MMHG

## 2024-10-22 DIAGNOSIS — K70.9 ALCOHOLIC LIVER DISEASE, UNSPECIFIED: ICD-10-CM

## 2024-10-22 DIAGNOSIS — R74.8 ABNORMAL LEVELS OF OTHER SERUM ENZYMES: ICD-10-CM

## 2024-10-22 PROCEDURE — 99204 OFFICE O/P NEW MOD 45 MIN: CPT

## 2024-10-22 PROCEDURE — 99214 OFFICE O/P EST MOD 30 MIN: CPT

## 2024-10-24 ENCOUNTER — NON-APPOINTMENT (OUTPATIENT)
Age: 69
End: 2024-10-24

## 2024-10-24 LAB
ALBUMIN SERPL ELPH-MCNC: 3.5 G/DL
ALP BLD-CCNC: 139 U/L
ALT SERPL-CCNC: 38 U/L
ANION GAP SERPL CALC-SCNC: 14 MMOL/L
AST SERPL-CCNC: 48 U/L
BILIRUB SERPL-MCNC: 0.5 MG/DL
BUN SERPL-MCNC: 23 MG/DL
CALCIUM SERPL-MCNC: 8.7 MG/DL
CHLORIDE SERPL-SCNC: 104 MMOL/L
CO2 SERPL-SCNC: 22 MMOL/L
CREAT SERPL-MCNC: 1.03 MG/DL
EGFR: 80 ML/MIN/1.73M2
GLUCOSE SERPL-MCNC: 170 MG/DL
HCT VFR BLD CALC: 44 %
HGB BLD-MCNC: 14.2 G/DL
INR PPP: 1.09 RATIO
MCHC RBC-ENTMCNC: 31 PG
MCHC RBC-ENTMCNC: 32.3 GM/DL
MCV RBC AUTO: 96.1 FL
PLATELET # BLD AUTO: 208 K/UL
POTASSIUM SERPL-SCNC: 4.5 MMOL/L
PROT SERPL-MCNC: 6.7 G/DL
PT BLD: 13 SEC
RBC # BLD: 4.58 M/UL
RBC # FLD: 17.2 %
SODIUM SERPL-SCNC: 140 MMOL/L
WBC # FLD AUTO: 10.03 K/UL

## 2024-10-28 ENCOUNTER — NON-APPOINTMENT (OUTPATIENT)
Age: 69
End: 2024-10-28

## 2024-10-28 LAB
ALPHA-1-FETOPROTEIN-L3: 6.7 %
ALPHA-1-FETOPROTEIN: 5.6 NG/ML
PETH 16:0/18:1: NEGATIVE NG/ML
PETH 16:0/18:2: NEGATIVE NG/ML
PETH COMMENTS: NORMAL

## 2024-11-18 ENCOUNTER — APPOINTMENT (OUTPATIENT)
Dept: HEPATOLOGY | Facility: CLINIC | Age: 69
End: 2024-11-18

## (undated) DEVICE — TUBING IV SET GRAVITY 3Y 100" MACRO

## (undated) DEVICE — TUBING MEDI-VAC W MAXIGRIP CONNECTORS 1/4"X6'

## (undated) DEVICE — BIOPSY FORCEP COLD DISP

## (undated) DEVICE — DRSG CURITY GAUZE SPONGE 4 X 4" 12-PLY NON-STERILE

## (undated) DEVICE — LUBRICATING JELLY HR ONE SHOT 3G

## (undated) DEVICE — CATH IV SAFE BC 22G X 1" (BLUE)

## (undated) DEVICE — FORCEP RADIAL JAW 4 W NDL 2.2MM 2.8MM 240CM ORANGE DISP

## (undated) DEVICE — CONTAINER FORMALIN 80ML YELLOW

## (undated) DEVICE — DENTURE CUP PINK

## (undated) DEVICE — SALIVA EJECTOR (BLUE)

## (undated) DEVICE — DRSG 2X2

## (undated) DEVICE — BIOPSY FORCEP RADIAL JAW 4 STANDARD WITH NEEDLE

## (undated) DEVICE — UNDERPAD LINEN SAVER 17 X 24"

## (undated) DEVICE — BASIN EMESIS 10IN GRADUATED MAUVE

## (undated) DEVICE — BITE BLOCK ADULT 20 X 27MM (GREEN)

## (undated) DEVICE — DRSG BANDAID 0.75X3"

## (undated) DEVICE — GOWN LG

## (undated) DEVICE — ELCTR ECG CONDUCTIVE ADHESIVE

## (undated) DEVICE — CLAMP BX HOT RAD JAW 3

## (undated) DEVICE — PACK IV START WITH CHG